# Patient Record
Sex: FEMALE | Race: BLACK OR AFRICAN AMERICAN | NOT HISPANIC OR LATINO
[De-identification: names, ages, dates, MRNs, and addresses within clinical notes are randomized per-mention and may not be internally consistent; named-entity substitution may affect disease eponyms.]

---

## 2017-03-28 ENCOUNTER — RESULT REVIEW (OUTPATIENT)
Age: 62
End: 2017-03-28

## 2017-03-29 ENCOUNTER — OUTPATIENT (OUTPATIENT)
Dept: OUTPATIENT SERVICES | Facility: HOSPITAL | Age: 62
LOS: 1 days | End: 2017-03-29

## 2017-03-29 ENCOUNTER — APPOINTMENT (OUTPATIENT)
Dept: OBGYN | Facility: HOSPITAL | Age: 62
End: 2017-03-29

## 2017-03-29 ENCOUNTER — APPOINTMENT (OUTPATIENT)
Dept: INTERNAL MEDICINE | Facility: HOSPITAL | Age: 62
End: 2017-03-29

## 2017-03-29 VITALS
SYSTOLIC BLOOD PRESSURE: 150 MMHG | DIASTOLIC BLOOD PRESSURE: 82 MMHG | BODY MASS INDEX: 37.91 KG/M2 | WEIGHT: 214 LBS | HEART RATE: 61 BPM

## 2017-03-29 VITALS — DIASTOLIC BLOOD PRESSURE: 76 MMHG | SYSTOLIC BLOOD PRESSURE: 142 MMHG

## 2017-03-29 DIAGNOSIS — L85.3 XEROSIS CUTIS: ICD-10-CM

## 2017-03-29 DIAGNOSIS — L29.9 PRURITUS, UNSPECIFIED: ICD-10-CM

## 2017-03-29 DIAGNOSIS — Z98.89 OTHER SPECIFIED POSTPROCEDURAL STATES: Chronic | ICD-10-CM

## 2017-03-29 DIAGNOSIS — L95.8 OTHER VASCULITIS LIMITED TO THE SKIN: ICD-10-CM

## 2017-03-29 DIAGNOSIS — Z12.11 ENCOUNTER FOR SCREENING FOR MALIGNANT NEOPLASM OF COLON: ICD-10-CM

## 2017-03-30 DIAGNOSIS — I10 ESSENTIAL (PRIMARY) HYPERTENSION: ICD-10-CM

## 2017-03-30 DIAGNOSIS — Z01.419 ENCOUNTER FOR GYNECOLOGICAL EXAMINATION (GENERAL) (ROUTINE) WITHOUT ABNORMAL FINDINGS: ICD-10-CM

## 2017-03-30 DIAGNOSIS — R73.09 OTHER ABNORMAL GLUCOSE: ICD-10-CM

## 2017-03-30 DIAGNOSIS — E66.9 OBESITY, UNSPECIFIED: ICD-10-CM

## 2017-03-31 DIAGNOSIS — R73.09 OTHER ABNORMAL GLUCOSE: ICD-10-CM

## 2017-03-31 DIAGNOSIS — I10 ESSENTIAL (PRIMARY) HYPERTENSION: ICD-10-CM

## 2017-03-31 DIAGNOSIS — E66.9 OBESITY, UNSPECIFIED: ICD-10-CM

## 2017-07-11 ENCOUNTER — APPOINTMENT (OUTPATIENT)
Dept: INTERNAL MEDICINE | Facility: HOSPITAL | Age: 62
End: 2017-07-11

## 2017-07-11 ENCOUNTER — OUTPATIENT (OUTPATIENT)
Dept: OUTPATIENT SERVICES | Facility: HOSPITAL | Age: 62
LOS: 1 days | End: 2017-07-11
Payer: COMMERCIAL

## 2017-07-11 ENCOUNTER — LABORATORY RESULT (OUTPATIENT)
Age: 62
End: 2017-07-11

## 2017-07-11 VITALS — SYSTOLIC BLOOD PRESSURE: 153 MMHG | HEART RATE: 84 BPM | DIASTOLIC BLOOD PRESSURE: 84 MMHG

## 2017-07-11 VITALS — WEIGHT: 211 LBS | HEIGHT: 63 IN | BODY MASS INDEX: 37.39 KG/M2

## 2017-07-11 DIAGNOSIS — Z98.89 OTHER SPECIFIED POSTPROCEDURAL STATES: Chronic | ICD-10-CM

## 2017-07-11 LAB
CHOLEST SERPL-MCNC: 218 MG/DL — HIGH (ref 120–199)
HDLC SERPL-MCNC: 87 MG/DL — HIGH (ref 45–65)
LIPID PNL WITH DIRECT LDL SERPL: 127 MG/DL — SIGNIFICANT CHANGE UP
TRIGL SERPL-MCNC: 80 MG/DL — SIGNIFICANT CHANGE UP (ref 10–149)

## 2017-07-12 DIAGNOSIS — R73.09 OTHER ABNORMAL GLUCOSE: ICD-10-CM

## 2017-07-12 DIAGNOSIS — K21.9 GASTRO-ESOPHAGEAL REFLUX DISEASE WITHOUT ESOPHAGITIS: ICD-10-CM

## 2017-07-12 DIAGNOSIS — M25.569 PAIN IN UNSPECIFIED KNEE: ICD-10-CM

## 2017-07-12 DIAGNOSIS — E66.9 OBESITY, UNSPECIFIED: ICD-10-CM

## 2017-07-12 DIAGNOSIS — I10 ESSENTIAL (PRIMARY) HYPERTENSION: ICD-10-CM

## 2017-07-16 ENCOUNTER — FORM ENCOUNTER (OUTPATIENT)
Age: 62
End: 2017-07-16

## 2017-07-17 ENCOUNTER — APPOINTMENT (OUTPATIENT)
Dept: RADIOLOGY | Facility: HOSPITAL | Age: 62
End: 2017-07-17

## 2017-07-17 PROCEDURE — 73562 X-RAY EXAM OF KNEE 3: CPT | Mod: 26,RT

## 2017-07-26 ENCOUNTER — APPOINTMENT (OUTPATIENT)
Dept: ORTHOPEDIC SURGERY | Facility: HOSPITAL | Age: 62
End: 2017-07-26

## 2017-08-09 ENCOUNTER — OUTPATIENT (OUTPATIENT)
Dept: OUTPATIENT SERVICES | Facility: HOSPITAL | Age: 62
LOS: 1 days | End: 2017-08-09

## 2017-08-09 ENCOUNTER — APPOINTMENT (OUTPATIENT)
Dept: INTERNAL MEDICINE | Facility: HOSPITAL | Age: 62
End: 2017-08-09

## 2017-08-09 ENCOUNTER — EMERGENCY (EMERGENCY)
Facility: HOSPITAL | Age: 62
LOS: 1 days | Discharge: HOME | End: 2017-08-09
Attending: EMERGENCY MEDICINE | Admitting: EMERGENCY MEDICINE
Payer: MEDICAID

## 2017-08-09 VITALS
SYSTOLIC BLOOD PRESSURE: 140 MMHG | RESPIRATION RATE: 16 BRPM | TEMPERATURE: 98 F | DIASTOLIC BLOOD PRESSURE: 73 MMHG | HEART RATE: 55 BPM | OXYGEN SATURATION: 100 %

## 2017-08-09 VITALS
TEMPERATURE: 97 F | DIASTOLIC BLOOD PRESSURE: 75 MMHG | OXYGEN SATURATION: 98 % | RESPIRATION RATE: 18 BRPM | SYSTOLIC BLOOD PRESSURE: 142 MMHG | HEART RATE: 59 BPM

## 2017-08-09 VITALS — WEIGHT: 212 LBS | HEIGHT: 63 IN | BODY MASS INDEX: 37.56 KG/M2

## 2017-08-09 DIAGNOSIS — Z98.89 OTHER SPECIFIED POSTPROCEDURAL STATES: Chronic | ICD-10-CM

## 2017-08-09 PROCEDURE — 93970 EXTREMITY STUDY: CPT | Mod: 26

## 2017-08-09 PROCEDURE — 99284 EMERGENCY DEPT VISIT MOD MDM: CPT

## 2017-08-10 DIAGNOSIS — M79.89 OTHER SPECIFIED SOFT TISSUE DISORDERS: ICD-10-CM

## 2018-01-03 ENCOUNTER — OUTPATIENT (OUTPATIENT)
Dept: OUTPATIENT SERVICES | Facility: HOSPITAL | Age: 63
LOS: 1 days | End: 2018-01-03

## 2018-01-03 ENCOUNTER — LABORATORY RESULT (OUTPATIENT)
Age: 63
End: 2018-01-03

## 2018-01-03 ENCOUNTER — APPOINTMENT (OUTPATIENT)
Dept: INTERNAL MEDICINE | Facility: HOSPITAL | Age: 63
End: 2018-01-03

## 2018-01-03 VITALS — WEIGHT: 212.07 LBS | HEIGHT: 63 IN | BODY MASS INDEX: 37.57 KG/M2

## 2018-01-03 VITALS — DIASTOLIC BLOOD PRESSURE: 68 MMHG | SYSTOLIC BLOOD PRESSURE: 134 MMHG

## 2018-01-03 DIAGNOSIS — Z98.89 OTHER SPECIFIED POSTPROCEDURAL STATES: Chronic | ICD-10-CM

## 2018-01-03 LAB
BUN SERPL-MCNC: 10 MG/DL — SIGNIFICANT CHANGE UP (ref 7–23)
CALCIUM SERPL-MCNC: 9.4 MG/DL — SIGNIFICANT CHANGE UP (ref 8.4–10.5)
CHLORIDE SERPL-SCNC: 102 MMOL/L — SIGNIFICANT CHANGE UP (ref 98–107)
CHOLEST SERPL-MCNC: 226 MG/DL — HIGH (ref 120–199)
CO2 SERPL-SCNC: 28 MMOL/L — SIGNIFICANT CHANGE UP (ref 22–31)
CREAT SERPL-MCNC: 0.75 MG/DL — SIGNIFICANT CHANGE UP (ref 0.5–1.3)
GLUCOSE SERPL-MCNC: 93 MG/DL — SIGNIFICANT CHANGE UP (ref 70–99)
HBA1C BLD-MCNC: 6 % — HIGH (ref 4–5.6)
HDLC SERPL-MCNC: 76 MG/DL — HIGH (ref 45–65)
LIPID PNL WITH DIRECT LDL SERPL: 140 MG/DL — SIGNIFICANT CHANGE UP
POTASSIUM SERPL-MCNC: 3.8 MMOL/L — SIGNIFICANT CHANGE UP (ref 3.5–5.3)
POTASSIUM SERPL-SCNC: 3.8 MMOL/L — SIGNIFICANT CHANGE UP (ref 3.5–5.3)
SODIUM SERPL-SCNC: 142 MMOL/L — SIGNIFICANT CHANGE UP (ref 135–145)
TRIGL SERPL-MCNC: 113 MG/DL — SIGNIFICANT CHANGE UP (ref 10–149)

## 2018-01-08 DIAGNOSIS — E66.9 OBESITY, UNSPECIFIED: ICD-10-CM

## 2018-01-08 DIAGNOSIS — J30.9 ALLERGIC RHINITIS, UNSPECIFIED: ICD-10-CM

## 2018-01-08 DIAGNOSIS — E78.5 HYPERLIPIDEMIA, UNSPECIFIED: ICD-10-CM

## 2018-01-08 DIAGNOSIS — I10 ESSENTIAL (PRIMARY) HYPERTENSION: ICD-10-CM

## 2018-01-08 DIAGNOSIS — R01.1 CARDIAC MURMUR, UNSPECIFIED: ICD-10-CM

## 2018-01-08 DIAGNOSIS — M25.569 PAIN IN UNSPECIFIED KNEE: ICD-10-CM

## 2018-01-08 DIAGNOSIS — R73.09 OTHER ABNORMAL GLUCOSE: ICD-10-CM

## 2018-01-17 ENCOUNTER — APPOINTMENT (OUTPATIENT)
Dept: ORTHOPEDIC SURGERY | Facility: HOSPITAL | Age: 63
End: 2018-01-17

## 2018-01-25 ENCOUNTER — APPOINTMENT (OUTPATIENT)
Dept: CV DIAGNOSITCS | Facility: HOSPITAL | Age: 63
End: 2018-01-25
Payer: COMMERCIAL

## 2018-01-25 ENCOUNTER — OUTPATIENT (OUTPATIENT)
Dept: OUTPATIENT SERVICES | Facility: HOSPITAL | Age: 63
LOS: 1 days | End: 2018-01-25

## 2018-01-25 DIAGNOSIS — R01.1 CARDIAC MURMUR, UNSPECIFIED: ICD-10-CM

## 2018-01-25 DIAGNOSIS — Z98.89 OTHER SPECIFIED POSTPROCEDURAL STATES: Chronic | ICD-10-CM

## 2018-01-25 PROCEDURE — 93306 TTE W/DOPPLER COMPLETE: CPT | Mod: 26

## 2018-05-31 ENCOUNTER — FORM ENCOUNTER (OUTPATIENT)
Age: 63
End: 2018-05-31

## 2018-06-01 ENCOUNTER — OUTPATIENT (OUTPATIENT)
Dept: OUTPATIENT SERVICES | Facility: HOSPITAL | Age: 63
LOS: 1 days | End: 2018-06-01
Payer: SELF-PAY

## 2018-06-01 ENCOUNTER — APPOINTMENT (OUTPATIENT)
Dept: MAMMOGRAPHY | Facility: IMAGING CENTER | Age: 63
End: 2018-06-01
Payer: COMMERCIAL

## 2018-06-01 DIAGNOSIS — Z98.89 OTHER SPECIFIED POSTPROCEDURAL STATES: Chronic | ICD-10-CM

## 2018-06-01 DIAGNOSIS — Z00.00 ENCOUNTER FOR GENERAL ADULT MEDICAL EXAMINATION WITHOUT ABNORMAL FINDINGS: ICD-10-CM

## 2018-06-01 PROCEDURE — 77067 SCR MAMMO BI INCL CAD: CPT

## 2018-06-01 PROCEDURE — 77067 SCR MAMMO BI INCL CAD: CPT | Mod: 26

## 2018-06-01 PROCEDURE — 77063 BREAST TOMOSYNTHESIS BI: CPT | Mod: 26

## 2018-06-01 PROCEDURE — 77063 BREAST TOMOSYNTHESIS BI: CPT

## 2018-06-22 ENCOUNTER — OUTPATIENT (OUTPATIENT)
Dept: OUTPATIENT SERVICES | Facility: HOSPITAL | Age: 63
LOS: 1 days | End: 2018-06-22
Payer: COMMERCIAL

## 2018-06-22 ENCOUNTER — APPOINTMENT (OUTPATIENT)
Dept: INTERNAL MEDICINE | Facility: HOSPITAL | Age: 63
End: 2018-06-22

## 2018-06-22 VITALS — SYSTOLIC BLOOD PRESSURE: 122 MMHG | DIASTOLIC BLOOD PRESSURE: 71 MMHG | HEART RATE: 67 BPM

## 2018-06-22 VITALS — HEIGHT: 63 IN | BODY MASS INDEX: 38.62 KG/M2 | WEIGHT: 218 LBS

## 2018-06-22 DIAGNOSIS — R73.09 OTHER ABNORMAL GLUCOSE: ICD-10-CM

## 2018-06-22 DIAGNOSIS — K21.9 GASTRO-ESOPHAGEAL REFLUX DISEASE WITHOUT ESOPHAGITIS: ICD-10-CM

## 2018-06-22 DIAGNOSIS — Z98.89 OTHER SPECIFIED POSTPROCEDURAL STATES: Chronic | ICD-10-CM

## 2018-06-22 DIAGNOSIS — Z00.00 ENCOUNTER FOR GENERAL ADULT MEDICAL EXAMINATION WITHOUT ABNORMAL FINDINGS: ICD-10-CM

## 2018-06-22 DIAGNOSIS — R01.1 CARDIAC MURMUR, UNSPECIFIED: ICD-10-CM

## 2018-06-22 DIAGNOSIS — J30.9 ALLERGIC RHINITIS, UNSPECIFIED: ICD-10-CM

## 2018-06-22 DIAGNOSIS — L50.8 OTHER URTICARIA: ICD-10-CM

## 2018-06-22 DIAGNOSIS — E78.5 HYPERLIPIDEMIA, UNSPECIFIED: ICD-10-CM

## 2018-06-22 DIAGNOSIS — E66.9 OBESITY, UNSPECIFIED: ICD-10-CM

## 2018-06-22 DIAGNOSIS — I10 ESSENTIAL (PRIMARY) HYPERTENSION: ICD-10-CM

## 2018-06-22 DIAGNOSIS — G56.00 CARPAL TUNNEL SYNDROME, UNSPECIFIED UPPER LIMB: ICD-10-CM

## 2019-01-14 ENCOUNTER — LABORATORY RESULT (OUTPATIENT)
Age: 64
End: 2019-01-14

## 2019-01-14 ENCOUNTER — OUTPATIENT (OUTPATIENT)
Dept: OUTPATIENT SERVICES | Facility: HOSPITAL | Age: 64
LOS: 1 days | End: 2019-01-14

## 2019-01-14 ENCOUNTER — APPOINTMENT (OUTPATIENT)
Dept: INTERNAL MEDICINE | Facility: HOSPITAL | Age: 64
End: 2019-01-14

## 2019-01-14 VITALS — SYSTOLIC BLOOD PRESSURE: 153 MMHG | HEART RATE: 73 BPM | RESPIRATION RATE: 16 BRPM | DIASTOLIC BLOOD PRESSURE: 91 MMHG

## 2019-01-14 VITALS — WEIGHT: 216.56 LBS | HEIGHT: 63 IN | BODY MASS INDEX: 38.37 KG/M2

## 2019-01-14 DIAGNOSIS — Z98.89 OTHER SPECIFIED POSTPROCEDURAL STATES: Chronic | ICD-10-CM

## 2019-01-14 LAB
ALBUMIN SERPL ELPH-MCNC: 4.7 G/DL — SIGNIFICANT CHANGE UP (ref 3.3–5)
ALP SERPL-CCNC: 98 U/L — SIGNIFICANT CHANGE UP (ref 40–120)
ALT FLD-CCNC: 15 U/L — SIGNIFICANT CHANGE UP (ref 4–33)
ANION GAP SERPL CALC-SCNC: 13 MEQ/L — SIGNIFICANT CHANGE UP (ref 7–14)
AST SERPL-CCNC: 21 U/L — SIGNIFICANT CHANGE UP (ref 4–32)
BILIRUB SERPL-MCNC: 0.2 MG/DL — SIGNIFICANT CHANGE UP (ref 0.2–1.2)
BUN SERPL-MCNC: 13 MG/DL — SIGNIFICANT CHANGE UP (ref 7–23)
CALCIUM SERPL-MCNC: 9.8 MG/DL — SIGNIFICANT CHANGE UP (ref 8.4–10.5)
CHLORIDE SERPL-SCNC: 101 MMOL/L — SIGNIFICANT CHANGE UP (ref 98–107)
CO2 SERPL-SCNC: 29 MMOL/L — SIGNIFICANT CHANGE UP (ref 22–31)
CREAT SERPL-MCNC: 0.69 MG/DL — SIGNIFICANT CHANGE UP (ref 0.5–1.3)
GLUCOSE SERPL-MCNC: 87 MG/DL — SIGNIFICANT CHANGE UP (ref 70–99)
HBA1C BLD-MCNC: 6 % — HIGH (ref 4–5.6)
MAGNESIUM SERPL-MCNC: 2 MG/DL — SIGNIFICANT CHANGE UP (ref 1.6–2.6)
PHOSPHATE SERPL-MCNC: 2.5 MG/DL — SIGNIFICANT CHANGE UP (ref 2.5–4.5)
POTASSIUM SERPL-MCNC: 3.4 MMOL/L — LOW (ref 3.5–5.3)
POTASSIUM SERPL-SCNC: 3.4 MMOL/L — LOW (ref 3.5–5.3)
PROT SERPL-MCNC: 8.4 G/DL — HIGH (ref 6–8.3)
SODIUM SERPL-SCNC: 143 MMOL/L — SIGNIFICANT CHANGE UP (ref 135–145)

## 2019-01-14 NOTE — PHYSICAL EXAM
[No Acute Distress] : no acute distress [Well Nourished] : well nourished [Well Developed] : well developed [Normal Sclera/Conjunctiva] : normal sclera/conjunctiva [PERRL] : pupils equal round and reactive to light [EOMI] : extraocular movements intact [Normal Outer Ear/Nose] : the outer ears and nose were normal in appearance [Normal Oropharynx] : the oropharynx was normal [No JVD] : no jugular venous distention [Supple] : supple [No Lymphadenopathy] : no lymphadenopathy [No Respiratory Distress] : no respiratory distress  [Clear to Auscultation] : lungs were clear to auscultation bilaterally [Normal Rate] : normal rate  [Regular Rhythm] : with a regular rhythm [Normal S1, S2] : normal S1 and S2 [II] : a grade 2 [Mid] : mid [Pedal Pulses Present] : the pedal pulses are present [No Extremity Clubbing/Cyanosis] : no extremity clubbing/cyanosis [Soft] : abdomen soft [Non Tender] : non-tender [Non-distended] : non-distended [No HSM] : no HSM [Normal Supraclavicular Nodes] : no supraclavicular lymphadenopathy [Normal Anterior Cervical Nodes] : no anterior cervical lymphadenopathy [No CVA Tenderness] : no CVA  tenderness [No Spinal Tenderness] : no spinal tenderness [Grossly Normal Strength/Tone] : grossly normal strength/tone [No Rash] : no rash [No Skin Lesions] : no skin lesions [Coordination Grossly Intact] : coordination grossly intact [No Focal Deficits] : no focal deficits [Normal Affect] : the affect was normal [Alert and Oriented x3] : oriented to person, place, and time [Normal Mood] : the mood was normal [Normal Insight/Judgement] : insight and judgment were intact [de-identified] : pain on passive and active ROM of right knee, no crepitus or popping noted

## 2019-01-14 NOTE — ASSESSMENT
[FreeTextEntry1] : 62 yo F with PMH of obesity, HTN, preDM, L medial meniscal tear s/p arthroscopic surgery, GERD p/w follow up of chronic medical problems including right knee pain and occasional nasal congestion. \par \par 1) Right knee pain - denies erythema and swelling; pt does experience swelling in the summer.  Pt experiences "crackles" pt wakes up with morning stiffness; pain is bad at all times and there is no period of mitigation or exacerbation per the pt.  Pt does not take Tylenol or NSAIDs for pain.  \par - Likely due to OA. Recommended conservative measures including weight loss and pain control. Patient has no insurance and cannot afford PT.  Pt has seen orthopedic surgery one time; had injection of synovial fluid mimetic, which improved her pain temporarily. \par - R and L knee x-rays 4 view\par - Tylenol recommended for pain\par - ortho referral for TKR eval\par - [facility] c/s for assistance w/ insurance for PT\par - referred for complex care to assist w/ coordination \par \par 2) Nasal congestion- Likely due to allergic rhinitis, prescribed Flonase; pt not using flonase, and is not significant on this examination.  \par \par 3) Elevated A1c- Patient counseled about lifestyle modifications, A1c of 6; regarding her diet, she just "eats breads and all those snacks."  Pt does not have a machine to check sugar at home and requested one.  \par - A1c today\par - will start Metformin 500 BID to assist w/ control and weight loss\par - nutrition c/s\par - dietary counseling given\par \par 4) HTN- BP mildly elevated today \par - increase to Amlodipine 10\par - c/w HCTZ\par - encouraged pt to maintain low salt diet and take BP at home once a week; already has home BP machine\par \par 5) Systolic heart murmur- not appreciated on my exam, TTE WNL, minimal mitral and tricuspid regurgitation\par \par 6) Obesity- Counseled on lifestyle modifications; pt is non-compliant with diet\par - nutrition c/s\par \par 7) HCM- Patient defers flu vaccine, cannot Shingrix\par - Patient has referral for mammogram, UTD w/ PAP (1/2018) and colonoscopy (2016) both of which were normal\par - Hep C screen negative, declines HIV screen\par - will check A1c/CBC/CMP/Mg/Phos/TSH today\par \par RTC in 5 weeks\par \par Deonte Becker, F5\par D/w Dr. Torrez\par

## 2019-01-14 NOTE — END OF VISIT
[] : Resident [FreeTextEntry3] : 63F with morbid obesity, pre-DM, HTN, GERd here for follow-up. BP elevated today will increase norvasc to 10mg. Starting metformin for pre-DM and obesity. Orthopedics referral given for knee pain, patient likely requires assessment for TKR. Agree with remainder of plan as docuented by Dr. Becker.

## 2019-01-14 NOTE — DATA REVIEWED
[FreeTextEntry1] : CBC and CMP from 8/2017 WNL\par Lipid profile 7/2017: TGs 80, HDL 87, \par A1c 6.3% 12/2016\par Hep C screen negative\par \par PAP and HPV negative 3/2017\par Mammogram 2/2016 [facility] 1\par Colonoscopy WNL 11/2016\par \par Xray right knee showed mild-moderate right knee effusion and osteophyte, but preservation of the joint space

## 2019-01-14 NOTE — HISTORY OF PRESENT ILLNESS
[de-identified] : 61 yo F with PMH of obesity, HTN, preDM, medial meniscal tear s/p arthroscopic surgery, GERD p/w follow up of chronic medical problems including right knee pain. \par \par Pt is still complaining of  breast spasm.  Pain located along latissiumus dorsi muscle and back. Pt is unsure if it feels more like a "tired arm pit."  There's no TTP, discharge, no skin changes/dimpling/swelling in any part of the breast, and describes it as a "spasm."  She only has it at night, relaxes, and then has it twice a night.  \par \par Regarding chronic knee pain, it has not improved, but it's not worsening.  She has had this for years.  She is able to ambulate without assistance, but experiences considerable discomfort when bearing weight or moving the joint. Tyenol and ibuprofen provide minimal relief. She had a cortisone injection about 1 year ago which did not relieve her pain. Occasionally experiences clicking or popping of the joint. Denies trauma to the knee. No fevers or chills.  \par \par Regarding chronic nasal congestion, it still persists but is less.  She has a history of allergic rhinitis, but has not used any oral medications but has tried a nasal spray whose name she does not remember and if she uses it too frequently, she has a nosebleed.  Discharge is clear; cough has resolved; still has lleft-sided head and sinus pressure occasionally. Denies fever, chills, CP, SOB. \par \par Concerning obesity and elevated A1c, she states that she tries to avoid caloric foods, but eats more bread and sweets than she would like. Her activity is limited by her knee pain. She had seen a nutritionist in the past and is aware of what changes she can make in her diet, but has not implemented these recommendations as of yet. \par \par ROS otherwise negative [FreeTextEntry1] : routine follow up

## 2019-01-14 NOTE — REVIEW OF SYSTEMS
[Fatigue] : fatigue [Nasal Discharge] : nasal discharge [Cough] : cough [Joint Pain] : joint pain [Joint Stiffness] : joint stiffness [Negative] : Heme/Lymph [Discharge] : no discharge [Vision Problems] : no vision problems [Hearing Loss] : no hearing loss [Sore Throat] : no sore throat [Chest Pain] : no chest pain [Palpitations] : no palpitations [Orthopnea] : no orthopnea [Shortness Of Breath] : no shortness of breath [Wheezing] : no wheezing [Dyspnea on Exertion] : no dyspnea on exertion [Abdominal Pain] : no abdominal pain [Nausea] : no nausea [Vomiting] : no vomiting [Melena] : no melena [Dysuria] : no dysuria [Hematuria] : no hematuria [Muscle Pain] : no muscle pain [Back Pain] : no back pain [Itching] : no itching [Skin Rash] : no skin rash [Headache] : no headache [Dizziness] : no dizziness [Anxiety] : no anxiety [Depression] : no depression [Easy Bleeding] : no easy bleeding [Easy Bruising] : no easy bruising [FreeTextEntry9] : knee, possible MSK pain in the breast region

## 2019-01-15 DIAGNOSIS — Z00.00 ENCOUNTER FOR GENERAL ADULT MEDICAL EXAMINATION WITHOUT ABNORMAL FINDINGS: ICD-10-CM

## 2019-01-15 DIAGNOSIS — I10 ESSENTIAL (PRIMARY) HYPERTENSION: ICD-10-CM

## 2019-01-15 DIAGNOSIS — R01.1 CARDIAC MURMUR, UNSPECIFIED: ICD-10-CM

## 2019-01-15 DIAGNOSIS — R73.09 OTHER ABNORMAL GLUCOSE: ICD-10-CM

## 2019-01-15 DIAGNOSIS — E78.5 HYPERLIPIDEMIA, UNSPECIFIED: ICD-10-CM

## 2019-01-15 DIAGNOSIS — E66.9 OBESITY, UNSPECIFIED: ICD-10-CM

## 2019-01-15 DIAGNOSIS — K21.9 GASTRO-ESOPHAGEAL REFLUX DISEASE WITHOUT ESOPHAGITIS: ICD-10-CM

## 2019-01-15 DIAGNOSIS — M25.569 PAIN IN UNSPECIFIED KNEE: ICD-10-CM

## 2019-01-18 NOTE — REVIEW OF SYSTEMS
[Nasal Discharge] : nasal discharge [Cough] : cough [Joint Pain] : joint pain [Joint Stiffness] : joint stiffness [Fatigue] : fatigue [Negative] : Heme/Lymph [Discharge] : no discharge [Vision Problems] : no vision problems [Hearing Loss] : no hearing loss [Sore Throat] : no sore throat [Chest Pain] : no chest pain [Palpitations] : no palpitations [Orthopnea] : no orthopnea [Shortness Of Breath] : no shortness of breath [Wheezing] : no wheezing [Dyspnea on Exertion] : no dyspnea on exertion [Abdominal Pain] : no abdominal pain [Nausea] : no nausea [Vomiting] : no vomiting [Melena] : no melena [Dysuria] : no dysuria [Hematuria] : no hematuria [Muscle Pain] : no muscle pain [Back Pain] : no back pain [Itching] : no itching [Skin Rash] : no skin rash [Headache] : no headache [Dizziness] : no dizziness [Anxiety] : no anxiety [Depression] : no depression [Easy Bleeding] : no easy bleeding [Easy Bruising] : no easy bruising [FreeTextEntry9] : knee, possible MSK pain in the breast region

## 2019-01-18 NOTE — END OF VISIT
[] : Resident [FreeTextEntry3] : 63F with morbid obesity, preDM, HTN, GERD here for follow-up, BP not controlled today, will increase norvasc to 10mg. Given obestiy and preDM will start metformin. Agree with remainder of plan as documented above by Dr. Becker.

## 2019-01-18 NOTE — ASSESSMENT
[FreeTextEntry1] : 61 yo F with PMH of obesity, HTN, preDM, medial meniscal tear s/p arthroscopic surgery, GERD p/w follow up of chronic medical problems including right knee pain and nasal congestion. \par \par 1) Right knee pain- Likely due to OA. Recommended conservative measures including weight loss and pain control. Pt has no insurance for PT\par - has not followed up with orthopedic surgery - will refer\par - R knee x-ray\par - [facility] c/s for assistance w/ insurance\par \par 2) Nasal congestion- Likely due to allergic rhinitis, prescribed Flonase; pt not using\par \par 3) Elevated A1c- Patient counseled about lifestyle modifications, A1c of 6\par - recheck A1c today to see how pt has improved\par - diabetes wellness\par - nutrition c/s\par - nutrition counseling given during visit; pt aware to avoid excessive amounts of bread, rice, cake, and other sweets\par \par 4) HTN- BP inadequately controlled\par - increase amlodipine to 10 mg\par - c/w HCTZ 25\par - f/u in 5 weeks for BP check\par - pt educated to check BPs at home every few days \par \par 5) Obesity- Counseled on lifestyle modifications; pt is non-compliant with diet\par - nutrition c/s\par \par 7) HCM- Patient defers flu vaccine, cannot afford Shingrix\par - Patient has referral for mammogram, UTD w/ PAP (1/2018) and colonoscopy (2016) both of which were normal\par - Hep C screen negative, declines HIV screen\par - check CMP, CBC, A1c, TSH, Mg, Phos, Alb/Cr\par - will refer for mammogram today\par \par RTC in 6 months for routine HCM\par \par Deonte Becker, F5\par d/w

## 2019-01-18 NOTE — PHYSICAL EXAM
[No Acute Distress] : no acute distress [Well Nourished] : well nourished [Well Developed] : well developed [Normal Sclera/Conjunctiva] : normal sclera/conjunctiva [PERRL] : pupils equal round and reactive to light [EOMI] : extraocular movements intact [Normal Outer Ear/Nose] : the outer ears and nose were normal in appearance [Normal Oropharynx] : the oropharynx was normal [No JVD] : no jugular venous distention [Supple] : supple [No Lymphadenopathy] : no lymphadenopathy [No Respiratory Distress] : no respiratory distress  [Clear to Auscultation] : lungs were clear to auscultation bilaterally [Normal Rate] : normal rate  [Regular Rhythm] : with a regular rhythm [Normal S1, S2] : normal S1 and S2 [II] : a grade 2 [Mid] : mid [Pedal Pulses Present] : the pedal pulses are present [No Extremity Clubbing/Cyanosis] : no extremity clubbing/cyanosis [Soft] : abdomen soft [Non Tender] : non-tender [Non-distended] : non-distended [No HSM] : no HSM [Normal Supraclavicular Nodes] : no supraclavicular lymphadenopathy [Normal Anterior Cervical Nodes] : no anterior cervical lymphadenopathy [No CVA Tenderness] : no CVA  tenderness [No Spinal Tenderness] : no spinal tenderness [Grossly Normal Strength/Tone] : grossly normal strength/tone [No Rash] : no rash [No Skin Lesions] : no skin lesions [Coordination Grossly Intact] : coordination grossly intact [No Focal Deficits] : no focal deficits [Normal Affect] : the affect was normal [Alert and Oriented x3] : oriented to person, place, and time [Normal Mood] : the mood was normal [Normal Insight/Judgement] : insight and judgment were intact [de-identified] : pain on passive and active ROM of right knee, no crepitus or popping noted

## 2019-01-18 NOTE — HISTORY OF PRESENT ILLNESS
[FreeTextEntry1] : routine follow up [de-identified] : 63 yo F with PMH of obesity, HTN, preDM, medial meniscal tear s/p arthroscopic surgery, GERD p/w follow up of chronic medical problems including right knee pain. \par \par The new issue for her is a breast spasm.  Pt is unsure if it feels more like a "tired arm pit."  There's no TTP, discharge, no skin changes/dimpling/swelling in any part of the breast, and describes it as a "spasm."  She only has it at night, relaxes, and then has it twice a night.  \par \par Regarding chronic knee pain, it has not improved, but it's not worsening.  She has had this for years.  She is able to ambulate without assistance, but experiences considerable discomfort when bearing weight or moving the joint. Tyenol and ibuprofen provide minimal relief. She had a cortisone injection about 1 year ago which did not relieve her pain. Occasionally experiences clicking or popping of the joint. Denies trauma to the knee. No fevers or chills.  \par \par Regarding chronic nasal congestion, it still persists.  She has a history of allergic rhinitis, but has not used any oral medications but has tried a nasal spray whose name she does not remember and if she uses it too frequently, she has a nosebleed.  Discharge is clear; cough has resolved; still has lleft-sided head and sinus pressure occasionally. Denies fever, chills, CP, SOB. \par \par Concerning obesity and elevated A1c, she states that she tries to avoid caloric foods, but eats more bread and sweets than she would like. Her activity is limited by her knee pain. She had seen a nutritionist in the past and is aware of what changes she can make in her diet, but has not implemented these recommendations as of yet. \par \par Pt is complaining of acid reflux for which she takes Nexium.  Pt does not take Atorvastatin; takes only Amlodipine and HCTZ.

## 2019-01-22 ENCOUNTER — FORM ENCOUNTER (OUTPATIENT)
Age: 64
End: 2019-01-22

## 2019-01-23 ENCOUNTER — APPOINTMENT (OUTPATIENT)
Dept: RADIOLOGY | Facility: HOSPITAL | Age: 64
End: 2019-01-23

## 2019-01-23 ENCOUNTER — OTHER (OUTPATIENT)
Age: 64
End: 2019-01-23

## 2019-01-23 PROCEDURE — 73564 X-RAY EXAM KNEE 4 OR MORE: CPT | Mod: 26,50

## 2019-02-07 ENCOUNTER — APPOINTMENT (OUTPATIENT)
Dept: INTERNAL MEDICINE | Facility: HOSPITAL | Age: 64
End: 2019-02-07

## 2019-02-12 ENCOUNTER — APPOINTMENT (OUTPATIENT)
Dept: INTERNAL MEDICINE | Facility: HOSPITAL | Age: 64
End: 2019-02-12

## 2019-03-27 NOTE — ED ADULT TRIAGE NOTE - TEMPERATURE IN FAHRENHEIT (DEGREES F)
Pt arrives per bell from a group home with failure to thrive and refusing to talk. Per EMs pt placed there x 3 weeks ago from home and sx have increased. Pt shakes head for yes or no questions. Pt refusing to talk. Pt denies any pain  
97.1

## 2019-03-29 ENCOUNTER — APPOINTMENT (OUTPATIENT)
Dept: INTERNAL MEDICINE | Facility: HOSPITAL | Age: 64
End: 2019-03-29

## 2019-03-29 ENCOUNTER — LABORATORY RESULT (OUTPATIENT)
Age: 64
End: 2019-03-29

## 2019-03-29 ENCOUNTER — OUTPATIENT (OUTPATIENT)
Dept: OUTPATIENT SERVICES | Facility: HOSPITAL | Age: 64
LOS: 1 days | End: 2019-03-29

## 2019-03-29 VITALS — HEART RATE: 77 BPM | SYSTOLIC BLOOD PRESSURE: 142 MMHG | DIASTOLIC BLOOD PRESSURE: 72 MMHG | RESPIRATION RATE: 16 BRPM

## 2019-03-29 VITALS — HEIGHT: 63 IN | BODY MASS INDEX: 37.82 KG/M2 | WEIGHT: 213.44 LBS

## 2019-03-29 DIAGNOSIS — Z98.89 OTHER SPECIFIED POSTPROCEDURAL STATES: Chronic | ICD-10-CM

## 2019-03-29 LAB
APPEARANCE UR: CLEAR — SIGNIFICANT CHANGE UP
BILIRUB UR-MCNC: NEGATIVE — SIGNIFICANT CHANGE UP
BLOOD UR QL VISUAL: NEGATIVE — SIGNIFICANT CHANGE UP
COLOR SPEC: SIGNIFICANT CHANGE UP
GLUCOSE UR-MCNC: NEGATIVE — SIGNIFICANT CHANGE UP
KETONES UR-MCNC: NEGATIVE — SIGNIFICANT CHANGE UP
LEUKOCYTE ESTERASE UR-ACNC: NEGATIVE — SIGNIFICANT CHANGE UP
NITRITE UR-MCNC: NEGATIVE — SIGNIFICANT CHANGE UP
PH UR: 7 — SIGNIFICANT CHANGE UP (ref 5–8)
PROT UR-MCNC: NEGATIVE — SIGNIFICANT CHANGE UP
SP GR SPEC: 1.02 — SIGNIFICANT CHANGE UP (ref 1–1.04)
UROBILINOGEN FLD QL: NORMAL — SIGNIFICANT CHANGE UP

## 2019-03-29 RX ORDER — ATORVASTATIN CALCIUM 10 MG/1
10 TABLET, FILM COATED ORAL
Qty: 90 | Refills: 1 | Status: DISCONTINUED | COMMUNITY
Start: 2018-01-05 | End: 2019-03-29

## 2019-03-29 RX ORDER — HYDROCHLOROTHIAZIDE 25 MG/1
25 TABLET ORAL DAILY
Qty: 30 | Refills: 5 | Status: DISCONTINUED | COMMUNITY
Start: 2019-01-14 | End: 2019-03-29

## 2019-03-29 RX ORDER — ATORVASTATIN CALCIUM 20 MG/1
20 TABLET, FILM COATED ORAL
Qty: 30 | Refills: 5 | Status: DISCONTINUED | COMMUNITY
Start: 2019-01-14 | End: 2019-03-29

## 2019-03-29 NOTE — HISTORY OF PRESENT ILLNESS
[FreeTextEntry1] : routine follow up [de-identified] : 62 yo F with PMH of obesity, HTN, preDM, medial meniscal tear s/p arthroscopic surgery, GERD p/w follow up of chronic medical problems including right knee pain. \par \par On Saturday, pt fell, R knee popped.  Pt did not hit her head or lost consciousness; pt hit her "sore breast."  \par \par Pt states that when she urinates, it is "shooting up onto the toilet seat" and it "trickles."  Pt denies dysuria, hematuria, denies strange smell.  Pt is requesting referral to gynecologist.  \par \par Pt is still complaining of  breast spasm.  Pain located along latissiumus dorsi muscle and back. Pt is unsure if it feels more like a "tired arm pit."  There's no TTP, discharge, no skin changes/dimpling/swelling in any part of the breast, and describes it as a "spasm."  She only has it at night, relaxes, and then has it twice a night.  \par \par Regarding chronic R knee pain, it was very bad; went to an orthopedic surgeon and got a cortisone shot about a month ago.  She has had this for years.  She is able to ambulate without assistance, but experiences considerable discomfort when bearing weight or moving the joint. Tyenol and ibuprofen provide minimal relief. She had a cortisone injection about 1 year ago which did not relieve her pain. Occasionally experiences clicking or popping of the joint. Denies trauma to the knee. No fevers or chills.  \par \par Regarding chronic nasal congestion, and states that "she can't find her throat" and scrapes until she "spits blood."  Pt uses Flonase, but feels like there's "something in the back of the throat," but occasionally has nose bleed.  She has a history of allergic rhinitis, but has not used any oral medications but has tried a nasal spray but upon review, she has been using it incorrectly.  Discharge is clear; cough has resolved; still has left-sided head and sinus pressure occasionally. Denies fever, chills, CP, SOB. This has been happening for years.  \par \par Concerning obesity and elevated A1c, she states that she tries to avoid caloric foods, but eats more bread and sweets than she would lik but is trying to cut down - she does not eat a whole loaf of bread in a day anymore and is trying to eat less rice. Her activity is limited by her knee pain. She had seen a nutritionist in the past and is aware of what changes she must make.\par \par ROS otherwise negative

## 2019-03-29 NOTE — PHYSICAL EXAM
[No Acute Distress] : no acute distress [Well Nourished] : well nourished [Well Developed] : well developed [Normal Sclera/Conjunctiva] : normal sclera/conjunctiva [PERRL] : pupils equal round and reactive to light [EOMI] : extraocular movements intact [Normal Outer Ear/Nose] : the outer ears and nose were normal in appearance [Normal Oropharynx] : the oropharynx was normal [No JVD] : no jugular venous distention [Supple] : supple [No Lymphadenopathy] : no lymphadenopathy [No Respiratory Distress] : no respiratory distress  [Clear to Auscultation] : lungs were clear to auscultation bilaterally [Normal Rate] : normal rate  [Regular Rhythm] : with a regular rhythm [Normal S1, S2] : normal S1 and S2 [II] : a grade 2 [Mid] : mid [Pedal Pulses Present] : the pedal pulses are present [No Extremity Clubbing/Cyanosis] : no extremity clubbing/cyanosis [Soft] : abdomen soft [Non Tender] : non-tender [Non-distended] : non-distended [No HSM] : no HSM [Normal Supraclavicular Nodes] : no supraclavicular lymphadenopathy [Normal Anterior Cervical Nodes] : no anterior cervical lymphadenopathy [No CVA Tenderness] : no CVA  tenderness [No Spinal Tenderness] : no spinal tenderness [Grossly Normal Strength/Tone] : grossly normal strength/tone [No Rash] : no rash [No Skin Lesions] : no skin lesions [Coordination Grossly Intact] : coordination grossly intact [No Focal Deficits] : no focal deficits [Normal Affect] : the affect was normal [Alert and Oriented x3] : oriented to person, place, and time [Normal Mood] : the mood was normal [Normal Insight/Judgement] : insight and judgment were intact [de-identified] : 2+ b/l LE edema [de-identified] : pain on passive and active ROM of right knee, no crepitus or popping noted

## 2019-03-29 NOTE — REVIEW OF SYSTEMS
[Nasal Discharge] : nasal discharge [Cough] : cough [Joint Pain] : joint pain [Joint Stiffness] : joint stiffness [Negative] : Heme/Lymph [Fatigue] : no fatigue [Discharge] : no discharge [Vision Problems] : no vision problems [Hearing Loss] : no hearing loss [Sore Throat] : no sore throat [Chest Pain] : no chest pain [Palpitations] : no palpitations [Orthopnea] : no orthopnea [Shortness Of Breath] : no shortness of breath [Wheezing] : no wheezing [Dyspnea on Exertion] : no dyspnea on exertion [Abdominal Pain] : no abdominal pain [Nausea] : no nausea [Vomiting] : no vomiting [Melena] : no melena [Dysuria] : no dysuria [Hematuria] : no hematuria [Muscle Pain] : no muscle pain [Back Pain] : no back pain [Itching] : no itching [Skin Rash] : no skin rash [Headache] : no headache [Dizziness] : no dizziness [Anxiety] : no anxiety [Depression] : no depression [Easy Bleeding] : no easy bleeding [Easy Bruising] : no easy bruising [FreeTextEntry9] : knee, possible MSK pain in the breast region

## 2019-03-29 NOTE — ASSESSMENT
[FreeTextEntry1] : 64 yo F with PMH of obesity, HTN, preDM, L medial meniscal tear s/p arthroscopic surgery, GERD p/w follow up of chronic medical problems including right knee pain and occasional nasal congestion. \par \par 1) Right knee pain - denies erythema and swelling; pt does experience swelling in the summer.  Pt experiences "crackles" pt wakes up with morning stiffness; pain is bad at all times and there is no period of mitigation or exacerbation per the pt.  Pt does not take Tylenol or NSAIDs for pain.  \par - Likely due to OA. Recommended conservative measures including weight loss and pain control. Patient has no insurance and cannot afford PT.  Pt has seen orthopedic surgery one time; had injection of synovial fluid mimetic, which improved her pain temporarily. \par - Tylenol recommended for pain\par - referred for complex care to assist w/ coordination \par - c/w ortho follow up on her own as she sees someone outside of [facility]\par \par 2) Nasal congestion- Likely due to allergic rhinitis, prescribed Flonase; pt not using flonase, and is not significant on this examination.  \par - pt educated on how to properly use flonase w/ demonstration\par \par 3) Elevated A1c- Patient counseled about lifestyle modifications, A1c of 6; regarding her diet, she just "eats breads and all those snacks." \par - will start Metformin 500 BID to assist w/ control and weight loss; pt is saying that she cannot afford it due to lack of insurance - referred to triage nurse for assistance w/ finding an affordable pharmacy\par - nutrition c/s\par - dietary counseling given again\par \par 4) HTN- BP mildly elevated today - pt has issues affording her medication due to social determinants \par - increase to Amlodipine 10\par - c/w HCTZ\par - encouraged pt to maintain low salt diet and take BP at home once a week; already has home BP machine\par \par 5) Systolic heart murmur- not appreciated on my exam, TTE WNL, minimal mitral and tricuspid regurgitation\par \par 6) Obesity- Counseled on lifestyle modifications; pt is non-compliant with diet\par - nutrition c/s\par \par 7) Breast spasm - unclear etiology\par - will get imaging of L breast w/ ultrasound due to dense breast to r/o malignancy not visible on mammogram \par - will refer to breast clinic\par \par 8) Upwards urination - unclear etiology\par - GYN referral + urology referral\par - UA today\par \par 9) HCM- Patient defers flu vaccine\par - Patient has referral for mammogram, UTD w/ PAP (1/2018) and colonoscopy (2016) both of which were normal\par - Hep C screen negative, declines HIV screen\par \par RTC in 5 weeks\par \par Deonte Becker, F5\par D/w Dr. Sanchez\par

## 2019-04-01 DIAGNOSIS — R73.09 OTHER ABNORMAL GLUCOSE: ICD-10-CM

## 2019-04-04 ENCOUNTER — APPOINTMENT (OUTPATIENT)
Dept: MAMMOGRAPHY | Facility: IMAGING CENTER | Age: 64
End: 2019-04-04
Payer: COMMERCIAL

## 2019-04-04 ENCOUNTER — OUTPATIENT (OUTPATIENT)
Dept: OUTPATIENT SERVICES | Facility: HOSPITAL | Age: 64
LOS: 1 days | End: 2019-04-04
Payer: SELF-PAY

## 2019-04-04 ENCOUNTER — APPOINTMENT (OUTPATIENT)
Dept: ULTRASOUND IMAGING | Facility: IMAGING CENTER | Age: 64
End: 2019-04-04
Payer: COMMERCIAL

## 2019-04-04 ENCOUNTER — OUTPATIENT (OUTPATIENT)
Dept: OUTPATIENT SERVICES | Facility: HOSPITAL | Age: 64
LOS: 1 days | End: 2019-04-04

## 2019-04-04 ENCOUNTER — APPOINTMENT (OUTPATIENT)
Dept: OBGYN | Facility: HOSPITAL | Age: 64
End: 2019-04-04

## 2019-04-04 DIAGNOSIS — Z98.89 OTHER SPECIFIED POSTPROCEDURAL STATES: Chronic | ICD-10-CM

## 2019-04-04 DIAGNOSIS — Z00.00 ENCOUNTER FOR GENERAL ADULT MEDICAL EXAMINATION WITHOUT ABNORMAL FINDINGS: ICD-10-CM

## 2019-04-04 PROCEDURE — 76641 ULTRASOUND BREAST COMPLETE: CPT | Mod: 26,50

## 2019-04-04 PROCEDURE — 77065 DX MAMMO INCL CAD UNI: CPT

## 2019-04-04 PROCEDURE — 77065 DX MAMMO INCL CAD UNI: CPT | Mod: 26,LT

## 2019-04-04 PROCEDURE — G0279: CPT

## 2019-04-04 PROCEDURE — G0279: CPT | Mod: 26

## 2019-04-04 PROCEDURE — 76641 ULTRASOUND BREAST COMPLETE: CPT

## 2019-04-05 DIAGNOSIS — Z12.39 ENCOUNTER FOR OTHER SCREENING FOR MALIGNANT NEOPLASM OF BREAST: ICD-10-CM

## 2019-04-10 ENCOUNTER — APPOINTMENT (OUTPATIENT)
Dept: OBGYN | Facility: HOSPITAL | Age: 64
End: 2019-04-10
Payer: COMMERCIAL

## 2019-04-10 ENCOUNTER — OUTPATIENT (OUTPATIENT)
Dept: OUTPATIENT SERVICES | Facility: HOSPITAL | Age: 64
LOS: 1 days | End: 2019-04-10

## 2019-04-10 VITALS
HEART RATE: 77 BPM | HEIGHT: 64.8 IN | BODY MASS INDEX: 35.32 KG/M2 | SYSTOLIC BLOOD PRESSURE: 145 MMHG | WEIGHT: 212 LBS | DIASTOLIC BLOOD PRESSURE: 79 MMHG

## 2019-04-10 DIAGNOSIS — Z98.89 OTHER SPECIFIED POSTPROCEDURAL STATES: Chronic | ICD-10-CM

## 2019-04-10 PROCEDURE — 99396 PREV VISIT EST AGE 40-64: CPT | Mod: GC

## 2019-04-11 DIAGNOSIS — R39.11 HESITANCY OF MICTURITION: ICD-10-CM

## 2019-04-11 DIAGNOSIS — Z01.419 ENCOUNTER FOR GYNECOLOGICAL EXAMINATION (GENERAL) (ROUTINE) WITHOUT ABNORMAL FINDINGS: ICD-10-CM

## 2019-04-12 NOTE — HISTORY OF PRESENT ILLNESS
[1 Year Ago] : 1 year ago [Postmenopausal] : is postmenopausal [Fair] : being in fair health [Fever] : no fever [Nausea] : no nausea [Vomiting] : no vomiting [Diarrhea] : no diarrhea [Vaginal Bleeding] : no vaginal bleeding [Pelvic Pressure] : no pelvic pressure [Dysuria] : no dysuria [Sexually Active] : is not sexually active

## 2019-04-12 NOTE — PHYSICAL EXAM
[Alert] : alert [Awake] : awake [Soft] : soft [Oriented x3] : oriented to person, place, and time [Normal] : vagina [Uterine Adnexae] : were not tender and not enlarged [No Bleeding] : there was no active vaginal bleeding [Cystocele] : a cystocele [Rectocele] : a rectocele [Acute Distress] : no acute distress [Mass] : no breast mass [Nipple Discharge] : no nipple discharge [Axillary LAD] : no axillary lymphadenopathy [Tender] : non tender [FreeTextEntry4] : small cystocele and small rectocele

## 2019-05-21 RX ORDER — METFORMIN HYDROCHLORIDE 500 MG/1
500 TABLET, COATED ORAL
Qty: 60 | Refills: 5 | Status: DISCONTINUED | COMMUNITY
Start: 2019-01-14 | End: 2019-05-21

## 2019-08-15 ENCOUNTER — OUTPATIENT (OUTPATIENT)
Dept: OUTPATIENT SERVICES | Facility: HOSPITAL | Age: 64
LOS: 1 days | End: 2019-08-15

## 2019-08-15 ENCOUNTER — APPOINTMENT (OUTPATIENT)
Dept: INTERNAL MEDICINE | Facility: CLINIC | Age: 64
End: 2019-08-15

## 2019-08-15 VITALS — HEART RATE: 98 BPM | HEIGHT: 64 IN | WEIGHT: 198 LBS | OXYGEN SATURATION: 60 % | BODY MASS INDEX: 33.8 KG/M2

## 2019-08-15 VITALS — DIASTOLIC BLOOD PRESSURE: 76 MMHG | SYSTOLIC BLOOD PRESSURE: 120 MMHG | RESPIRATION RATE: 16 BRPM | HEART RATE: 78 BPM

## 2019-08-15 DIAGNOSIS — Z01.419 ENCOUNTER FOR GYNECOLOGICAL EXAMINATION (GENERAL) (ROUTINE) W/OUT ABNORMAL FINDINGS: ICD-10-CM

## 2019-08-15 DIAGNOSIS — Z98.89 OTHER SPECIFIED POSTPROCEDURAL STATES: Chronic | ICD-10-CM

## 2019-08-15 NOTE — HISTORY OF PRESENT ILLNESS
[FreeTextEntry1] : routine follow up [de-identified] : 65 yo F with PMH of obesity, HTN, preDM, medial meniscal tear s/p arthroscopic surgery, GERD p/w follow up of chronic medical problems including right knee pain. \par \par Current complains include b/l LE edema, which has been "forever."  At the end of the day, "they're terrible."  Pt endorses skin changes and numbness at night and also itches.  The skin is also getting thicker.  The edema resolves in the morning.  Pt has used compression stockings, but cannot use them because she cannot bend her R knee.  \par \par Regarding chronic R knee pain, it was very bad; pt wishes to return to an orthopedic surgeon and got a cortisone shot about a month ago.  She has had this for years.  She is able to ambulate without assistance, but experiences considerable discomfort when bearing weight or moving the joint. Tyenol and ibuprofen provide minimal relief. She had a cortisone injection about 1 year ago which did not relieve her pain. Occasionally experiences clicking or popping of the joint. Denies trauma to the knee. No fevers or chills.  It keeps her awake at night.  Pt denies swelling in the knee.  Pain is most significant when she bends her knee.  No recent falls.  No recent trauma.  No recent insect bites or overlying cellulitis; has not had recent bug bites.  \par \par Last time pt was here, she said that her urine was "shooting up on the toilet seat."  She had a gyn appointment, which was unrevealing.  However, it is still happening.  Pt states that she feels like she's not emptying her bladder.  Pt denies dysuria or hematuria; urine is clear without foul smell.  \par \par Pt was complaining of  breast spasm, but it has resolved.  Pt had recent ultrasound of breast, which was generally benign.  Pt thinks it was a result of her knee popping and hitting her breast on the floor.  There's no TTP, discharge, no skin changes/dimpling/swelling in any part of the breast.\par \par Regarding chronic nasal congestion, and states that "she can't find her throat" and scrapes until she "spits blood."  Pt uses Flonase, but feels like there's "something in the back of the throat," but occasionally has nose bleed.  She has a history of allergic rhinitis, but has not used any oral medications but has tried a nasal spray but upon review, she has been using it incorrectly.  Discharge is clear; cough has resolved; still has left-sided head and sinus pressure occasionally. Denies fever, chills, CP, SOB. This has been happening for years.  \par \par Concerning obesity and elevated A1c, she states that she tries to avoid caloric foods, but eats more bread and sweets than she would lik but is trying to cut down - she does not eat a whole loaf of bread in a day anymore and is trying to eat less rice. Her activity is limited by her knee pain. She had seen a nutritionist in the past and is aware of what changes she must make.\par \par ROS otherwise negative

## 2019-08-15 NOTE — ASSESSMENT
[FreeTextEntry1] : 63 yo F with PMH of obesity, HTN, preDM, L medial meniscal tear s/p arthroscopic surgery, GERD p/w follow up of chronic medical problems including right knee pain and occasional nasal congestion. \par \par LE edema - b/l, has skin changes c/w venous stasis dermatitis; legs are symmetrical; may also be due to venous insufficiency; amlodipine may also be contributing to LE edema as well; no recent surgeries and low risk for DVT\par - encouraged pt to use compression stockings when possible\par - offered pt a vascular surgery referral but due to lack of insurance, pt prefers to follow up with house ortho first given that her R knee pain is more pressing\par \par Right knee pain - denies erythema and swelling; pt does experience swelling in the summer.  Pt experiences "crackles" pt wakes up with morning stiffness; pain is bad at all times and there is no period of mitigation or exacerbation per the pt.  Pt does not take Tylenol or NSAIDs for pain.  Concern for osteoarthritic changes on last R knee x-ray which showed osteophytes in various compartments w/o fracture\par - Likely due to OA. Recommended conservative measures including weight loss and pain control. Patient has no insurance and cannot afford PT.  Pt has seen orthopedic surgery one time; had injection of synovial fluid mimetic, which improved her pain temporarily. \par - Tylenol recommended for pain\par - will refer to house ortho at pt's request so that continuity of care can be maintained\par - pt cannot afford MRI because she has no insurance; pt previously a NADYA case due to social determinants affecting her ability to pay for medications\par \par  Elevated A1c- Patient counseled about lifestyle modifications, A1c of 6; regarding her diet, she just "eats breads and all those snacks." \par - c/w Metformin 500 BID to assist w/ control and weight loss; pt is saying that she cannot afford it due to lack of insurance - referred to triage nurse for assistance w/ finding an affordable pharmacy\par - nutrition c/s\par - dietary counseling given again; pt lost 4 pounds as she is no longer eating bread and eats less rice\par \par HTN- BP well-controlled at 120/76\par - c/w Amlodipine 10\par - c/w HCTZ\par - encouraged pt to maintain low salt diet and take BP at home once a week; already has home BP machine; asked pt to keep a BP log at home and to bring in the log at next visit\par \par hyperlipidemia - LDL high at 145\par - pt currently taking Simvastatin 20; will switch pt to Lipitor 20 mg\par - further dietary counseling given\par \par Systolic heart murmur- not appreciated on my exam, TTE WNL, minimal mitral and tricuspid regurgitation\par - will continue to monitor; pt has no clinical stigmata of heart failure at this time\par \par Obesity- Counseled on lifestyle modifications; pt compliance is improving as she is no longer eating bread, eating less rice, and has lost 4 pounds \par - c/w dietary and lifestyle modifications\par \par Upwards urination - unclear etiology\par - UA from 1/2019 bland\par - pt seen by gyn - no acute cause identified, no abnormal pathology identified\par \par HCM- Patient defers flu vaccine\par - offered pt Shingrix vaccine, but she refused as she cannot afford it without insurance\par - no documented pneumonia vaccine; pt declined at this visit again due to lack of insurance and ability to afford\par - TDap in 2015\par - last documented pap in 2017 but pt states she had a pap smear when she saw gyn in 4/2019\par - colonoscopy in 2016 - single sigmoid diverticulum with internal hemorrhoids; next colonoscopy in 2026\par - Patient has referral for mammogram, UTD w/ PAP (1/2018) and colonoscopy (2016) both of which were normal\par - Hep C screen negative, declines HIV screen\par \par RTC in 5 weeks\par \par Deonte Becker, F5\par D/w Dr. Sanchez\par

## 2019-08-15 NOTE — REVIEW OF SYSTEMS
[Nasal Discharge] : nasal discharge [Joint Pain] : joint pain [Joint Stiffness] : joint stiffness [Negative] : Heme/Lymph [Fatigue] : no fatigue [Discharge] : no discharge [Vision Problems] : no vision problems [Hearing Loss] : no hearing loss [Sore Throat] : no sore throat [Chest Pain] : no chest pain [Palpitations] : no palpitations [Orthopnea] : no orthopnea [Shortness Of Breath] : no shortness of breath [Wheezing] : no wheezing [Cough] : no cough [Dyspnea on Exertion] : no dyspnea on exertion [Abdominal Pain] : no abdominal pain [Nausea] : no nausea [Vomiting] : no vomiting [Melena] : no melena [Dysuria] : no dysuria [Hematuria] : no hematuria [Muscle Pain] : no muscle pain [Back Pain] : no back pain [Itching] : no itching [Skin Rash] : no skin rash [Headache] : no headache [Dizziness] : no dizziness [Anxiety] : no anxiety [Depression] : no depression [Easy Bleeding] : no easy bleeding [Easy Bruising] : no easy bruising [FreeTextEntry9] : knee

## 2019-08-15 NOTE — PHYSICAL EXAM
[No Acute Distress] : no acute distress [Well Nourished] : well nourished [Well Developed] : well developed [Normal Sclera/Conjunctiva] : normal sclera/conjunctiva [PERRL] : pupils equal round and reactive to light [EOMI] : extraocular movements intact [Normal Outer Ear/Nose] : the outer ears and nose were normal in appearance [Normal Oropharynx] : the oropharynx was normal [No JVD] : no jugular venous distention [Supple] : supple [No Lymphadenopathy] : no lymphadenopathy [No Respiratory Distress] : no respiratory distress  [Clear to Auscultation] : lungs were clear to auscultation bilaterally [Normal Rate] : normal rate  [Regular Rhythm] : with a regular rhythm [Normal S1, S2] : normal S1 and S2 [II] : a grade 2 [Mid] : mid [Pedal Pulses Present] : the pedal pulses are present [No Extremity Clubbing/Cyanosis] : no extremity clubbing/cyanosis [Soft] : abdomen soft [Non Tender] : non-tender [Non-distended] : non-distended [No HSM] : no HSM [Normal Supraclavicular Nodes] : no supraclavicular lymphadenopathy [No CVA Tenderness] : no CVA  tenderness [Normal Anterior Cervical Nodes] : no anterior cervical lymphadenopathy [Grossly Normal Strength/Tone] : grossly normal strength/tone [No Spinal Tenderness] : no spinal tenderness [No Rash] : no rash [No Skin Lesions] : no skin lesions [Coordination Grossly Intact] : coordination grossly intact [No Focal Deficits] : no focal deficits [Normal Affect] : the affect was normal [Alert and Oriented x3] : oriented to person, place, and time [Normal Mood] : the mood was normal [Normal Insight/Judgement] : insight and judgment were intact [de-identified] : 2+ b/l LE edema [de-identified] : pain on passive and active ROM of right knee, popping and crepitus noted on examination

## 2019-08-16 DIAGNOSIS — J30.9 ALLERGIC RHINITIS, UNSPECIFIED: ICD-10-CM

## 2019-08-16 DIAGNOSIS — E66.9 OBESITY, UNSPECIFIED: ICD-10-CM

## 2019-08-16 DIAGNOSIS — Z01.419 ENCOUNTER FOR GYNECOLOGICAL EXAMINATION (GENERAL) (ROUTINE) WITHOUT ABNORMAL FINDINGS: ICD-10-CM

## 2019-08-16 DIAGNOSIS — I10 ESSENTIAL (PRIMARY) HYPERTENSION: ICD-10-CM

## 2019-08-16 DIAGNOSIS — Z00.00 ENCOUNTER FOR GENERAL ADULT MEDICAL EXAMINATION WITHOUT ABNORMAL FINDINGS: ICD-10-CM

## 2019-08-16 DIAGNOSIS — E78.5 HYPERLIPIDEMIA, UNSPECIFIED: ICD-10-CM

## 2019-08-16 DIAGNOSIS — R73.03 PREDIABETES: ICD-10-CM

## 2019-08-16 DIAGNOSIS — M25.569 PAIN IN UNSPECIFIED KNEE: ICD-10-CM

## 2019-08-16 DIAGNOSIS — R73.09 OTHER ABNORMAL GLUCOSE: ICD-10-CM

## 2019-08-16 DIAGNOSIS — K21.9 GASTRO-ESOPHAGEAL REFLUX DISEASE WITHOUT ESOPHAGITIS: ICD-10-CM

## 2019-09-04 ENCOUNTER — APPOINTMENT (OUTPATIENT)
Dept: ORTHOPEDIC SURGERY | Facility: HOSPITAL | Age: 64
End: 2019-09-04

## 2019-09-04 ENCOUNTER — OUTPATIENT (OUTPATIENT)
Dept: OUTPATIENT SERVICES | Facility: HOSPITAL | Age: 64
LOS: 1 days | End: 2019-09-04

## 2019-09-04 VITALS
BODY MASS INDEX: 33.82 KG/M2 | DIASTOLIC BLOOD PRESSURE: 63 MMHG | HEART RATE: 61 BPM | WEIGHT: 197 LBS | SYSTOLIC BLOOD PRESSURE: 125 MMHG

## 2019-09-04 DIAGNOSIS — M25.561 PAIN IN RIGHT KNEE: ICD-10-CM

## 2019-09-04 DIAGNOSIS — Z98.89 OTHER SPECIFIED POSTPROCEDURAL STATES: Chronic | ICD-10-CM

## 2019-09-19 ENCOUNTER — OUTPATIENT (OUTPATIENT)
Dept: OUTPATIENT SERVICES | Facility: HOSPITAL | Age: 64
LOS: 1 days | End: 2019-09-19

## 2019-09-19 ENCOUNTER — APPOINTMENT (OUTPATIENT)
Dept: INTERNAL MEDICINE | Facility: CLINIC | Age: 64
End: 2019-09-19

## 2019-09-19 VITALS — WEIGHT: 189 LBS | HEIGHT: 64 IN | BODY MASS INDEX: 32.27 KG/M2

## 2019-09-19 VITALS — RESPIRATION RATE: 16 BRPM | HEART RATE: 68 BPM | SYSTOLIC BLOOD PRESSURE: 118 MMHG | DIASTOLIC BLOOD PRESSURE: 80 MMHG

## 2019-09-19 DIAGNOSIS — G56.00 CARPAL TUNNEL SYNDROME, UNSPECIFIED UPPER LIMB: ICD-10-CM

## 2019-09-19 DIAGNOSIS — Z98.89 OTHER SPECIFIED POSTPROCEDURAL STATES: Chronic | ICD-10-CM

## 2019-09-19 NOTE — ASSESSMENT
[FreeTextEntry1] : 63 yo F with PMH of obesity, HTN, preDM, L medial meniscal tear s/p arthroscopic surgery, GERD p/w follow up of chronic medical problems including right knee pain and occasional nasal congestion. \par \par LE edema - b/l, has skin changes c/w venous stasis dermatitis; legs are symmetrical; may also be due to venous insufficiency; amlodipine may also be contributing to LE edema as well; no recent surgeries and low risk for DVT\par - encouraged pt to use compression stockings when possible but refused\par - offered pt a vascular surgery referral but due to lack of insurance, pt prefers to follow up with house ortho first given that her R knee pain is more pressing\par \par Right knee pain - denies erythema and swelling; pt does experience swelling in the summer.  Pt experiences "crackles" pt wakes up with morning stiffness; pain is bad at all times and there is no period of mitigation or exacerbation per the pt.  Pt does not take Tylenol or NSAIDs for pain.  Concern for osteoarthritic changes on last R knee x-ray which showed osteophytes in various compartments w/o fracture\par - Likely due to OA and obesity. Recommended conservative measures including weight loss and pain control. Patient has no insurance and cannot afford PT.  Pt has seen orthopedic surgery one time; had injection of synovial fluid mimetic, which improved her pain temporarily. \par - Tylenol recommended for pain\par - c/w ortho f/u; s/p hydrocortisone injection in R knee\par - pt cannot afford MRI because she has no insurance; pt previously a NADYA case due to social determinants affecting her ability to pay for medications\par \par  Elevated A1c- Patient counseled about lifestyle modifications, A1c of 6; regarding her diet, she just "eats breads and all those snacks." \par - c/w Metformin 500 BID to assist w/ control and weight loss\par - f/u w/ nutrition\par - dietary counseling given again; pt additional weight as she is no longer eating bread/sweets and eats less rice\par \par HTN- BP well-controlled at 118/80\par - c/w Amlodipine 10\par - c/w HCTZ\par - encouraged pt to maintain low salt diet and take BP at home once a week; already has home BP machine; asked pt to keep a BP log at home and to bring in the log at next visit\par \par hyperlipidemia - LDL high at 145\par - c/w lipitor 20\par - further dietary counseling given\par \par Obesity- Counseled on lifestyle modifications; pt compliance is improving as she is no longer eating bread, eating less rice, and has lost 4 pounds \par - c/w dietary and lifestyle modifications\par \par HCM\par - medication renewals given\par - Patient defers flu vaccine\par - offered pt Shingrix vaccine, but she refused as she cannot afford it without insurance\par - no documented pneumonia vaccine; pt declined at this visit again due to lack of insurance and ability to afford\par - TDap in 2015\par - last documented pap in 2017 but pt states she had a pap smear when she saw gyn in 4/2019\par - colonoscopy in 2016 - single sigmoid diverticulum with internal hemorrhoids; next colonoscopy in 2026\par - Patient has referral for mammogram, UTD w/ PAP (1/2018) and colonoscopy (2016) both of which were normal\par - Hep C screen negative, declines HIV screen\par \par RTC in 3 months\par \par Deonte Becker, F5\par D/w Dr. Sanchez\par

## 2019-09-19 NOTE — HISTORY OF PRESENT ILLNESS
[FreeTextEntry1] : routine follow up [de-identified] : 63 yo F with PMH of obesity, HTN, preDM, R medial meniscal tear s/p arthroscopic surgery, GERD p/w follow up of chronic medical problems including right knee pain. \par \par Current complains include b/l LE edema, which has been "forever."  At the end of the day, "they're terrible."  Pt endorses skin changes and numbness at night and also itches.  The skin is also getting thicker.  The edema resolves in the morning.  Pt has used compression stockings, but cannot use them because she cannot bend her R knee.  Currently, pt states that the swelling is improved.  \par \par Regarding chronic R knee pain, pt had recent cortisone injection into R knee and it is doing much better.  She is able to ambulate without assistance, but experiences considerable discomfort when bearing weight or moving the joint.  Pt denies swelling in the knee.  Pain is most significant when she bends her knee.  No recent falls.  No recent trauma. Ortho recommends PT but pt cannot afford it because she does not have any insurance; pt has repeat follow up in October.    \par \par Regarding chronic nasal congestion, and states that "she can't find her throat" and scrapes until she "spits blood."  Pt uses Flonase, but feels like there's "something in the back of the throat," but occasionally has nose bleed.  She has a history of allergic rhinitis, but has not used any oral medications but has tried a nasal spray but upon review, she has been using it incorrectly.  Discharge is clear; cough has resolved; still has left-sided head and sinus pressure occasionally. Denies fever, chills, CP, SOB. This has been happening for years.  Pt does not want to see ENT and states "there's some things we have to live with." \par \par Concerning obesity and elevated A1c, she states that she tries to avoid caloric foods, and has cut out bread and sweets; she eats rice once a week.  Pt still drinks condensed milk. Her activity is limited by her knee pain. She had seen a nutritionist in the past and is aware of what changes she must make.\par \par ROS otherwise negative

## 2019-09-19 NOTE — PHYSICAL EXAM
[No Acute Distress] : no acute distress [Well Nourished] : well nourished [Well Developed] : well developed [Normal Sclera/Conjunctiva] : normal sclera/conjunctiva [PERRL] : pupils equal round and reactive to light [EOMI] : extraocular movements intact [Normal Outer Ear/Nose] : the outer ears and nose were normal in appearance [Normal Oropharynx] : the oropharynx was normal [No JVD] : no jugular venous distention [Supple] : supple [No Lymphadenopathy] : no lymphadenopathy [No Respiratory Distress] : no respiratory distress  [Clear to Auscultation] : lungs were clear to auscultation bilaterally [Normal Rate] : normal rate  [Regular Rhythm] : with a regular rhythm [Normal S1, S2] : normal S1 and S2 [II] : a grade 2 [Mid] : mid [Pedal Pulses Present] : the pedal pulses are present [No Extremity Clubbing/Cyanosis] : no extremity clubbing/cyanosis [Soft] : abdomen soft [Non Tender] : non-tender [Non-distended] : non-distended [No HSM] : no HSM [Normal Supraclavicular Nodes] : no supraclavicular lymphadenopathy [Normal Anterior Cervical Nodes] : no anterior cervical lymphadenopathy [No CVA Tenderness] : no CVA  tenderness [No Spinal Tenderness] : no spinal tenderness [Grossly Normal Strength/Tone] : grossly normal strength/tone [No Rash] : no rash [No Skin Lesions] : no skin lesions [Coordination Grossly Intact] : coordination grossly intact [No Focal Deficits] : no focal deficits [Normal Affect] : the affect was normal [Alert and Oriented x3] : oriented to person, place, and time [Normal Mood] : the mood was normal [Normal Insight/Judgement] : insight and judgment were intact [de-identified] : 2+ b/l LE edema [de-identified] : pain on passive and active ROM of right knee, popping and crepitus noted on examination

## 2019-10-16 ENCOUNTER — OUTPATIENT (OUTPATIENT)
Dept: OUTPATIENT SERVICES | Facility: HOSPITAL | Age: 64
LOS: 1 days | End: 2019-10-16

## 2019-10-16 ENCOUNTER — APPOINTMENT (OUTPATIENT)
Dept: ORTHOPEDIC SURGERY | Facility: HOSPITAL | Age: 64
End: 2019-10-16

## 2019-10-16 VITALS
DIASTOLIC BLOOD PRESSURE: 87 MMHG | BODY MASS INDEX: 32.61 KG/M2 | SYSTOLIC BLOOD PRESSURE: 156 MMHG | HEART RATE: 60 BPM | WEIGHT: 190 LBS

## 2019-10-16 DIAGNOSIS — Z98.89 OTHER SPECIFIED POSTPROCEDURAL STATES: Chronic | ICD-10-CM

## 2019-10-17 DIAGNOSIS — M17.0 BILATERAL PRIMARY OSTEOARTHRITIS OF KNEE: ICD-10-CM

## 2019-12-11 ENCOUNTER — APPOINTMENT (OUTPATIENT)
Dept: OBGYN | Facility: CLINIC | Age: 64
End: 2019-12-11
Payer: COMMERCIAL

## 2019-12-11 ENCOUNTER — OUTPATIENT (OUTPATIENT)
Dept: OUTPATIENT SERVICES | Facility: HOSPITAL | Age: 64
LOS: 1 days | End: 2019-12-11
Payer: SELF-PAY

## 2019-12-11 VITALS
SYSTOLIC BLOOD PRESSURE: 132 MMHG | WEIGHT: 182 LBS | HEIGHT: 64 IN | BODY MASS INDEX: 31.07 KG/M2 | DIASTOLIC BLOOD PRESSURE: 82 MMHG

## 2019-12-11 DIAGNOSIS — N76.0 ACUTE VAGINITIS: ICD-10-CM

## 2019-12-11 DIAGNOSIS — Z98.89 OTHER SPECIFIED POSTPROCEDURAL STATES: Chronic | ICD-10-CM

## 2019-12-11 PROCEDURE — G0463: CPT

## 2019-12-11 PROCEDURE — 99213 OFFICE O/P EST LOW 20 MIN: CPT | Mod: NC

## 2019-12-16 NOTE — PHYSICAL EXAM
[Normal] : uterus [No Bleeding] : there was no active vaginal bleeding [Uterine Adnexae] : were not tender and not enlarged [FreeTextEntry7] : uterine prolapse

## 2019-12-24 DIAGNOSIS — N81.4 UTEROVAGINAL PROLAPSE, UNSPECIFIED: ICD-10-CM

## 2020-01-02 ENCOUNTER — LABORATORY RESULT (OUTPATIENT)
Age: 65
End: 2020-01-02

## 2020-01-02 ENCOUNTER — APPOINTMENT (OUTPATIENT)
Dept: INTERNAL MEDICINE | Facility: CLINIC | Age: 65
End: 2020-01-02

## 2020-01-02 ENCOUNTER — OUTPATIENT (OUTPATIENT)
Dept: OUTPATIENT SERVICES | Facility: HOSPITAL | Age: 65
LOS: 1 days | End: 2020-01-02

## 2020-01-02 VITALS
HEART RATE: 71 BPM | SYSTOLIC BLOOD PRESSURE: 126 MMHG | OXYGEN SATURATION: 99 % | WEIGHT: 185.06 LBS | DIASTOLIC BLOOD PRESSURE: 74 MMHG | HEIGHT: 64 IN | BODY MASS INDEX: 31.59 KG/M2

## 2020-01-02 DIAGNOSIS — Z98.89 OTHER SPECIFIED POSTPROCEDURAL STATES: Chronic | ICD-10-CM

## 2020-01-02 DIAGNOSIS — R01.1 CARDIAC MURMUR, UNSPECIFIED: ICD-10-CM

## 2020-01-02 LAB
ALBUMIN SERPL ELPH-MCNC: 4.6 G/DL — SIGNIFICANT CHANGE UP (ref 3.3–5)
ALP SERPL-CCNC: 93 U/L — SIGNIFICANT CHANGE UP (ref 40–120)
ALT FLD-CCNC: 17 U/L — SIGNIFICANT CHANGE UP (ref 4–33)
ANION GAP SERPL CALC-SCNC: 12 MMO/L — SIGNIFICANT CHANGE UP (ref 7–14)
AST SERPL-CCNC: 24 U/L — SIGNIFICANT CHANGE UP (ref 4–32)
BASOPHILS # BLD AUTO: 0.04 K/UL — SIGNIFICANT CHANGE UP (ref 0–0.2)
BASOPHILS NFR BLD AUTO: 0.7 % — SIGNIFICANT CHANGE UP (ref 0–2)
BILIRUB SERPL-MCNC: 0.3 MG/DL — SIGNIFICANT CHANGE UP (ref 0.2–1.2)
BUN SERPL-MCNC: 11 MG/DL — SIGNIFICANT CHANGE UP (ref 7–23)
CALCIUM SERPL-MCNC: 10.4 MG/DL — SIGNIFICANT CHANGE UP (ref 8.4–10.5)
CHLORIDE SERPL-SCNC: 102 MMOL/L — SIGNIFICANT CHANGE UP (ref 98–107)
CHOLEST SERPL-MCNC: 255 MG/DL — HIGH (ref 120–199)
CO2 SERPL-SCNC: 27 MMOL/L — SIGNIFICANT CHANGE UP (ref 22–31)
CREAT SERPL-MCNC: 0.6 MG/DL — SIGNIFICANT CHANGE UP (ref 0.5–1.3)
EOSINOPHIL # BLD AUTO: 0.06 K/UL — SIGNIFICANT CHANGE UP (ref 0–0.5)
EOSINOPHIL NFR BLD AUTO: 1 % — SIGNIFICANT CHANGE UP (ref 0–6)
GLUCOSE SERPL-MCNC: 94 MG/DL — SIGNIFICANT CHANGE UP (ref 70–99)
HBA1C BLD-MCNC: 5.9 % — HIGH (ref 4–5.6)
HCT VFR BLD CALC: 41.8 % — SIGNIFICANT CHANGE UP (ref 34.5–45)
HDLC SERPL-MCNC: 88 MG/DL — HIGH (ref 45–65)
HGB BLD-MCNC: 12.9 G/DL — SIGNIFICANT CHANGE UP (ref 11.5–15.5)
IMM GRANULOCYTES NFR BLD AUTO: 0.5 % — SIGNIFICANT CHANGE UP (ref 0–1.5)
LIPID PNL WITH DIRECT LDL SERPL: 154 MG/DL — SIGNIFICANT CHANGE UP
LYMPHOCYTES # BLD AUTO: 3.03 K/UL — SIGNIFICANT CHANGE UP (ref 1–3.3)
LYMPHOCYTES # BLD AUTO: 49.3 % — HIGH (ref 13–44)
MAGNESIUM SERPL-MCNC: 2 MG/DL — SIGNIFICANT CHANGE UP (ref 1.6–2.6)
MCHC RBC-ENTMCNC: 25.7 PG — LOW (ref 27–34)
MCHC RBC-ENTMCNC: 30.9 % — LOW (ref 32–36)
MCV RBC AUTO: 83.3 FL — SIGNIFICANT CHANGE UP (ref 80–100)
MONOCYTES # BLD AUTO: 0.41 K/UL — SIGNIFICANT CHANGE UP (ref 0–0.9)
MONOCYTES NFR BLD AUTO: 6.7 % — SIGNIFICANT CHANGE UP (ref 2–14)
NEUTROPHILS # BLD AUTO: 2.57 K/UL — SIGNIFICANT CHANGE UP (ref 1.8–7.4)
NEUTROPHILS NFR BLD AUTO: 41.8 % — LOW (ref 43–77)
NRBC # FLD: 0 K/UL — SIGNIFICANT CHANGE UP (ref 0–0)
PHOSPHATE SERPL-MCNC: 2.6 MG/DL — SIGNIFICANT CHANGE UP (ref 2.5–4.5)
PLATELET # BLD AUTO: 337 K/UL — SIGNIFICANT CHANGE UP (ref 150–400)
PMV BLD: 10 FL — SIGNIFICANT CHANGE UP (ref 7–13)
POTASSIUM SERPL-MCNC: 3.8 MMOL/L — SIGNIFICANT CHANGE UP (ref 3.5–5.3)
POTASSIUM SERPL-SCNC: 3.8 MMOL/L — SIGNIFICANT CHANGE UP (ref 3.5–5.3)
PROT SERPL-MCNC: 8.1 G/DL — SIGNIFICANT CHANGE UP (ref 6–8.3)
RBC # BLD: 5.02 M/UL — SIGNIFICANT CHANGE UP (ref 3.8–5.2)
RBC # FLD: 14.4 % — SIGNIFICANT CHANGE UP (ref 10.3–14.5)
SODIUM SERPL-SCNC: 141 MMOL/L — SIGNIFICANT CHANGE UP (ref 135–145)
TRIGL SERPL-MCNC: 85 MG/DL — SIGNIFICANT CHANGE UP (ref 10–149)
WBC # BLD: 6.14 K/UL — SIGNIFICANT CHANGE UP (ref 3.8–10.5)
WBC # FLD AUTO: 6.14 K/UL — SIGNIFICANT CHANGE UP (ref 3.8–10.5)

## 2020-01-02 RX ORDER — OMEPRAZOLE 40 MG/1
40 CAPSULE, DELAYED RELEASE ORAL TWICE DAILY
Qty: 60 | Refills: 0 | Status: DISCONTINUED | COMMUNITY
Start: 2019-01-14 | End: 2020-01-02

## 2020-01-02 NOTE — REVIEW OF SYSTEMS
[Joint Stiffness] : joint stiffness [Negative] : Heme/Lymph [Fatigue] : no fatigue [Discharge] : no discharge [Vision Problems] : no vision problems [Hearing Loss] : no hearing loss [Nasal Discharge] : no nasal discharge [Sore Throat] : no sore throat [Chest Pain] : no chest pain [Palpitations] : no palpitations [Shortness Of Breath] : no shortness of breath [Orthopnea] : no orthopnea [Wheezing] : no wheezing [Cough] : no cough [Dyspnea on Exertion] : no dyspnea on exertion [Abdominal Pain] : no abdominal pain [Nausea] : no nausea [Vomiting] : no vomiting [Melena] : no melena [Dysuria] : no dysuria [Hematuria] : no hematuria [Joint Pain] : no joint pain [Muscle Pain] : no muscle pain [Back Pain] : no back pain [Itching] : no itching [Skin Rash] : no skin rash [Headache] : no headache [Dizziness] : no dizziness [Anxiety] : no anxiety [Depression] : no depression [Easy Bleeding] : no easy bleeding [Easy Bruising] : no easy bruising [FreeTextEntry9] : R knee

## 2020-01-02 NOTE — ASSESSMENT
[FreeTextEntry1] : 65 yo F with PMH of obesity, HTN, preDM, L medial meniscal tear s/p arthroscopic surgery, GERD p/w follow up of chronic medical problems including right knee pain and occasional nasal congestion. \par \par LE edema - b/l, has skin changes c/w venous stasis dermatitis; legs are symmetrical; may also be due to venous insufficiency; amlodipine may also be contributing to LE edema as well; no recent surgeries and low risk for DVT\par - encouraged pt to use compression stockings when possible but refused\par - offered pt a vascular surgery referral but due to lack of insurance, pt prefers to follow up with house ortho first given that her R knee pain is more pressing\par \par Right knee pain - likely OA c/b obesity; improved w/ recent steroid injection by ortho. Pt does not take Tylenol or NSAIDs for pain.  Concern for osteoarthritic changes on last R knee x-ray which showed osteophytes in various compartments w/o fracture\par - Likely due to OA and obesity. Recommended conservative measures including weight loss and pain control. Patient has no insurance and cannot afford PT.  \par - Tylenol recommended for pain\par - c/w ortho f/u; s/p hydrocortisone injection in R knee\par - will offer [facility] but pt declined due to cost as she has no insurance\par - pt cannot afford MRI because she has no insurance; pt previously a NADYA case due to social determinants affecting her ability to pay for medications\par \par  Elevated A1c- Patient counseled about lifestyle modifications, A1c of 6\par - will check A1c, urine microalbumin, lipid profile\par - c/w Metformin 500 BID to assist w/ control and weight loss\par - f/u w/ nutrition\par - dietary counseling given again; pt additional weight as she is no longer eating bread/sweets and eats less rice\par \par HTN- BP well-controlled at\par - c/w Amlodipine 10\par - c/w HCTZ\par - encouraged pt to maintain low salt diet and take BP at home once a week; already has home BP machine; asked pt to keep a BP log at home and to bring in the log at next visit\par \par hyperlipidemia \par - check lipid profile today\par - c/w simvastatin 20\par - further dietary counseling given\par \par Stage 2 uterine prolapse - stable\par - c/w GYN follow up\par \par Obesity- Counseled on lifestyle modifications; pt compliance is improving as she is no longer eating bread, eating less rice and stopped drinking juice and soda\par - c/w dietary and lifestyle modifications\par \par HCM\par - medication renewals given\par - Patient defers flu vaccine; is aware of the risks of getting the flu\par - offered pt Shingrix vaccine, but she refused as she cannot afford it without insurance\par - no documented pneumonia vaccine; pt declined at this visit again due to lack of insurance and ability to afford\par - TDap in 2015\par - Mammo on 4/19 was BiRads 2 - will give referral today for 2020\par - Pap - HPV negative on 5/19\par - colonoscopy in 2016 - single sigmoid diverticulum with internal hemorrhoids; next colonoscopy in 2026\par - Hep C screen negative, declines HIV screen\par \par RTC in 3 months\par \par Deonte Becker, F5\par D/w Dr. Ford\par

## 2020-01-02 NOTE — HISTORY OF PRESENT ILLNESS
[FreeTextEntry1] : routine follow up [de-identified] : 63 yo F with PMH of obesity, HTN, preDM, R medial meniscal tear s/p arthroscopic surgery, GERD p/w follow up of chronic medical problems including right knee pain presents for a follow up.  \par \par Patient has no active complaints.  Pt recently went to gyn; was diagnosed uterine prolapse stage 2.  No pessary was placed; pt is going to be seen again in April.  She was referred to a gyn-urologist.  Pt states it's uncomfortable but is not painful; no blood with urination but pt has urge to urinate.  Denies foul smelling urine.  \par \par Regarding her LE swelling, pt says that the swelling is "not so bad."  It is better in the winter.  the LE edema is not painful.  Pt tries to use compression stockings, but is limited due to discomfort.  \par \par Regarding her chronic R knee pain, it is better.  Pt went to ortho and injected corticosteroids.  Ortho does not believe her R knee pain is meniscal and that it is due to OA; recommended [facility] but cannot afford it.  Pt walks w/o assistance and has no discomfort since the cortisone shot.  Denies knee swelling and erythema.  Previously, pain was with R knee bending, but is now much improved.  No falls or trauma.   \par \par Regarding chronic nasal congestion, she's okay until she lies down.  Pt intermittently uses flonase because she has epistaxis if used too much. She has a history of allergic rhinitis, but has not used any oral medications; pt educated on last visit how to properly use nasal spray. Pt has a chronic post-nasal drip.  Denies fever, chills, CP, SOB. This has been happening for years.  Pt does not want to see ENT.\par \par Concerning obesity and elevated A1c, she states that she "still cheats a little but not viciously," tries to avoid caloric foods, and has cut out bread and sweets; she eats rice once a week.  Pt no longer deep fries her french fries; she bakes them.  Pt cut out juice and sodas. Pt still drinks condensed milk. Her activity is limited by her knee pain in R knee when knee hurts her. She had seen a nutritionist in the past and is aware of what changes she must make.\par \par ROS otherwise negative

## 2020-01-02 NOTE — PHYSICAL EXAM
[No Acute Distress] : no acute distress [Well Nourished] : well nourished [Well Developed] : well developed [Normal Sclera/Conjunctiva] : normal sclera/conjunctiva [PERRL] : pupils equal round and reactive to light [EOMI] : extraocular movements intact [Normal Outer Ear/Nose] : the outer ears and nose were normal in appearance [Normal Oropharynx] : the oropharynx was normal [No Lymphadenopathy] : no lymphadenopathy [Supple] : supple [No JVD] : no jugular venous distention [Clear to Auscultation] : lungs were clear to auscultation bilaterally [No Respiratory Distress] : no respiratory distress  [Normal Rate] : normal rate  [Regular Rhythm] : with a regular rhythm [Normal S1, S2] : normal S1 and S2 [II] : a grade 2 [Mid] : mid [Pedal Pulses Present] : the pedal pulses are present [No Extremity Clubbing/Cyanosis] : no extremity clubbing/cyanosis [Soft] : abdomen soft [Non-distended] : non-distended [Non Tender] : non-tender [No HSM] : no HSM [Normal Anterior Cervical Nodes] : no anterior cervical lymphadenopathy [Normal Supraclavicular Nodes] : no supraclavicular lymphadenopathy [No CVA Tenderness] : no CVA  tenderness [Grossly Normal Strength/Tone] : grossly normal strength/tone [No Spinal Tenderness] : no spinal tenderness [No Rash] : no rash [No Focal Deficits] : no focal deficits [Coordination Grossly Intact] : coordination grossly intact [No Skin Lesions] : no skin lesions [Alert and Oriented x3] : oriented to person, place, and time [Normal Affect] : the affect was normal [Normal Insight/Judgement] : insight and judgment were intact [Normal Mood] : the mood was normal [No Joint Swelling] : no joint swelling [de-identified] : 2+ b/l LE edema [de-identified] : crepitus and popping sound of R knee; no erythema

## 2020-01-03 ENCOUNTER — OTHER (OUTPATIENT)
Age: 65
End: 2020-01-03

## 2020-01-03 DIAGNOSIS — K21.9 GASTRO-ESOPHAGEAL REFLUX DISEASE WITHOUT ESOPHAGITIS: ICD-10-CM

## 2020-01-03 DIAGNOSIS — E78.5 HYPERLIPIDEMIA, UNSPECIFIED: ICD-10-CM

## 2020-01-03 DIAGNOSIS — I10 ESSENTIAL (PRIMARY) HYPERTENSION: ICD-10-CM

## 2020-01-03 DIAGNOSIS — R73.09 OTHER ABNORMAL GLUCOSE: ICD-10-CM

## 2020-01-03 DIAGNOSIS — R73.03 PREDIABETES: ICD-10-CM

## 2020-01-03 DIAGNOSIS — Z00.00 ENCOUNTER FOR GENERAL ADULT MEDICAL EXAMINATION WITHOUT ABNORMAL FINDINGS: ICD-10-CM

## 2020-01-03 DIAGNOSIS — R01.1 CARDIAC MURMUR, UNSPECIFIED: ICD-10-CM

## 2020-01-03 DIAGNOSIS — M25.569 PAIN IN UNSPECIFIED KNEE: ICD-10-CM

## 2020-01-03 LAB
CREAT UR-MCNC: 90 MG/DL — SIGNIFICANT CHANGE UP
MICROALBUMIN UR-MCNC: < 1.2 MG/DL — SIGNIFICANT CHANGE UP

## 2020-04-14 ENCOUNTER — APPOINTMENT (OUTPATIENT)
Dept: INTERNAL MEDICINE | Facility: CLINIC | Age: 65
End: 2020-04-14

## 2020-04-24 ENCOUNTER — APPOINTMENT (OUTPATIENT)
Dept: OBGYN | Facility: CLINIC | Age: 65
End: 2020-04-24

## 2020-10-06 ENCOUNTER — LABORATORY RESULT (OUTPATIENT)
Age: 65
End: 2020-10-06

## 2020-10-06 ENCOUNTER — APPOINTMENT (OUTPATIENT)
Dept: INTERNAL MEDICINE | Facility: CLINIC | Age: 65
End: 2020-10-06

## 2020-10-06 ENCOUNTER — OUTPATIENT (OUTPATIENT)
Dept: OUTPATIENT SERVICES | Facility: HOSPITAL | Age: 65
LOS: 1 days | End: 2020-10-06

## 2020-10-06 VITALS — TEMPERATURE: 98.2 F

## 2020-10-06 VITALS
HEART RATE: 69 BPM | DIASTOLIC BLOOD PRESSURE: 82 MMHG | OXYGEN SATURATION: 89 % | WEIGHT: 189 LBS | BODY MASS INDEX: 32.27 KG/M2 | HEIGHT: 64 IN | SYSTOLIC BLOOD PRESSURE: 124 MMHG

## 2020-10-06 DIAGNOSIS — M25.569 PAIN IN UNSPECIFIED KNEE: ICD-10-CM

## 2020-10-06 DIAGNOSIS — Z98.89 OTHER SPECIFIED POSTPROCEDURAL STATES: Chronic | ICD-10-CM

## 2020-10-06 DIAGNOSIS — E66.9 OBESITY, UNSPECIFIED: ICD-10-CM

## 2020-10-06 DIAGNOSIS — J30.9 ALLERGIC RHINITIS, UNSPECIFIED: ICD-10-CM

## 2020-10-06 LAB
ALBUMIN SERPL ELPH-MCNC: 4.8 G/DL — SIGNIFICANT CHANGE UP (ref 3.3–5)
ALP SERPL-CCNC: 86 U/L — SIGNIFICANT CHANGE UP (ref 40–120)
ALT FLD-CCNC: 12 U/L — SIGNIFICANT CHANGE UP (ref 4–33)
ANION GAP SERPL CALC-SCNC: 11 MMO/L — SIGNIFICANT CHANGE UP (ref 7–14)
AST SERPL-CCNC: 21 U/L — SIGNIFICANT CHANGE UP (ref 4–32)
BASOPHILS # BLD AUTO: 0.05 K/UL — SIGNIFICANT CHANGE UP (ref 0–0.2)
BASOPHILS NFR BLD AUTO: 0.9 % — SIGNIFICANT CHANGE UP (ref 0–2)
BILIRUB SERPL-MCNC: 0.2 MG/DL — SIGNIFICANT CHANGE UP (ref 0.2–1.2)
BUN SERPL-MCNC: 12 MG/DL — SIGNIFICANT CHANGE UP (ref 7–23)
CALCIUM SERPL-MCNC: 10.1 MG/DL — SIGNIFICANT CHANGE UP (ref 8.4–10.5)
CHLORIDE SERPL-SCNC: 102 MMOL/L — SIGNIFICANT CHANGE UP (ref 98–107)
CHOLEST SERPL-MCNC: 196 MG/DL — SIGNIFICANT CHANGE UP (ref 120–199)
CO2 SERPL-SCNC: 27 MMOL/L — SIGNIFICANT CHANGE UP (ref 22–31)
CREAT SERPL-MCNC: 0.56 MG/DL — SIGNIFICANT CHANGE UP (ref 0.5–1.3)
EOSINOPHIL # BLD AUTO: 0.09 K/UL — SIGNIFICANT CHANGE UP (ref 0–0.5)
EOSINOPHIL NFR BLD AUTO: 1.6 % — SIGNIFICANT CHANGE UP (ref 0–6)
GLUCOSE SERPL-MCNC: 93 MG/DL — SIGNIFICANT CHANGE UP (ref 70–99)
HBA1C BLD-MCNC: 5.8 % — HIGH (ref 4–5.6)
HCT VFR BLD CALC: 40.1 % — SIGNIFICANT CHANGE UP (ref 34.5–45)
HDLC SERPL-MCNC: 107 MG/DL — HIGH (ref 45–65)
HGB BLD-MCNC: 12.5 G/DL — SIGNIFICANT CHANGE UP (ref 11.5–15.5)
IMM GRANULOCYTES NFR BLD AUTO: 0.4 % — SIGNIFICANT CHANGE UP (ref 0–1.5)
LIPID PNL WITH DIRECT LDL SERPL: 87 MG/DL — SIGNIFICANT CHANGE UP
LYMPHOCYTES # BLD AUTO: 2.69 K/UL — SIGNIFICANT CHANGE UP (ref 1–3.3)
LYMPHOCYTES # BLD AUTO: 47.4 % — HIGH (ref 13–44)
MAGNESIUM SERPL-MCNC: 2 MG/DL — SIGNIFICANT CHANGE UP (ref 1.6–2.6)
MCHC RBC-ENTMCNC: 25.4 PG — LOW (ref 27–34)
MCHC RBC-ENTMCNC: 31.2 % — LOW (ref 32–36)
MCV RBC AUTO: 81.5 FL — SIGNIFICANT CHANGE UP (ref 80–100)
MONOCYTES # BLD AUTO: 0.42 K/UL — SIGNIFICANT CHANGE UP (ref 0–0.9)
MONOCYTES NFR BLD AUTO: 7.4 % — SIGNIFICANT CHANGE UP (ref 2–14)
NEUTROPHILS # BLD AUTO: 2.41 K/UL — SIGNIFICANT CHANGE UP (ref 1.8–7.4)
NEUTROPHILS NFR BLD AUTO: 42.3 % — LOW (ref 43–77)
NRBC # FLD: 0 K/UL — SIGNIFICANT CHANGE UP (ref 0–0)
PHOSPHATE SERPL-MCNC: 2.9 MG/DL — SIGNIFICANT CHANGE UP (ref 2.5–4.5)
PLATELET # BLD AUTO: 242 K/UL — SIGNIFICANT CHANGE UP (ref 150–400)
PMV BLD: 11.7 FL — SIGNIFICANT CHANGE UP (ref 7–13)
POTASSIUM SERPL-MCNC: 3.7 MMOL/L — SIGNIFICANT CHANGE UP (ref 3.5–5.3)
POTASSIUM SERPL-SCNC: 3.7 MMOL/L — SIGNIFICANT CHANGE UP (ref 3.5–5.3)
PROT SERPL-MCNC: 8 G/DL — SIGNIFICANT CHANGE UP (ref 6–8.3)
RBC # BLD: 4.92 M/UL — SIGNIFICANT CHANGE UP (ref 3.8–5.2)
RBC # FLD: 14.4 % — SIGNIFICANT CHANGE UP (ref 10.3–14.5)
SODIUM SERPL-SCNC: 140 MMOL/L — SIGNIFICANT CHANGE UP (ref 135–145)
TRIGL SERPL-MCNC: 85 MG/DL — SIGNIFICANT CHANGE UP (ref 10–149)
WBC # BLD: 5.68 K/UL — SIGNIFICANT CHANGE UP (ref 3.8–10.5)
WBC # FLD AUTO: 5.68 K/UL — SIGNIFICANT CHANGE UP (ref 3.8–10.5)

## 2020-10-06 RX ORDER — SIMVASTATIN 20 MG/1
20 TABLET, FILM COATED ORAL
Qty: 30 | Refills: 5 | Status: DISCONTINUED | COMMUNITY
Start: 2019-03-29 | End: 2020-10-06

## 2020-10-06 NOTE — REVIEW OF SYSTEMS
[Nasal Discharge] : nasal discharge [Postnasal Drip] : postnasal drip [Palpitations] : palpitations [Lower Ext Edema] : lower extremity edema [Negative] : Heme/Lymph [Earache] : no earache [Hearing Loss] : no hearing loss [Nosebleed] : no nosebleeds [Hoarseness] : no hoarseness [Chest Pain] : no chest pain [Leg Claudication] : no leg claudication [Orthopnea] : no orthopnea [Paroxysmal Nocturnal Dyspnea] : no paroxysmal nocturnal dyspnea [Dysuria] : no dysuria [Hematuria] : no hematuria [FreeTextEntry8] : uterine prolapse

## 2020-10-06 NOTE — ADDENDUM
[FreeTextEntry1] : reviewed with resident via telephone due to covid18 Agree with residents evaluation and plan [email or web address]

## 2020-10-06 NOTE — PHYSICAL EXAM
[Normal] : normal gait, coordination grossly intact, no focal deficits [de-identified] : Clear TMs b/l, no erythema, nobulging; Nose: (+) b/l erythematous/hypertrophic turbinates, no bleeding, no polyps seen [de-identified] : Normal rate, regular rhythm, s1s2, (+) grade II mid systolic murmur at RUSB [de-identified] : Distal pulses 2+, (+) b/l LE 1+ pitting edema R>L [de-identified] : no rashes, no lesions, dry and warm

## 2020-10-06 NOTE — HISTORY OF PRESENT ILLNESS
Patient admitted with infection to left knee. Per Op note dated 03/25/20 1050 documentation of debridement / I&D stating \"The bursal area had a thick fibrous buildup of tissue which was removed utilizing a curette only and scraping the capsule of the knee and the under-edges of the skin and subcu tissues. \" To accurately reflect the procedure performed please addend the note to include: ? Excisional debridement = cutting off or away- without replacement- devitalized tissue, necrosis or slough ? Non-excisional debridement = brushing, irrigating, scrubbing or washing of devitalized tissue, necrosis or slough The medical record reflects the following: 
 
  Risk Factors: Left knee infection status post knee replacement Clinical Indicators: \"The bursal area had a thick fibrous buildup of tissue which was removed utilizing a curette only and scraping the capsule of the knee and the under-edges of the skin and subcu tissues. \" Treatment: Received I&D to left knee Thank you, Rossana Winters, RN, BSN, [address] [place] [address] 
[phone] [FreeTextEntry1] : Wellness Visit/Follow Up [de-identified] : 64 yo F with h/o HTN, obesity, preDM, chronic right knee pain, uterine prolapse, presents for her annual visit. She denies any new complaints but says that she is here for follow up of her chronic medical problems. \par \par She is due to see her GYN provider for further management of her uterine prolapse (stage 2) but says that her symptoms are stable and include mild discomfort and urinary urgency but she denies any dysuria, hematuria or foul smelling urine.\par \par Her chronic right knee pain is moderately improved. She states that she has been able to tolerate pain without significant use of OTC medication. She has not followed up with Ortho since last January due to the pandemic. But she says that she will see her regular provider if needed for additional injections. She is not interested in pursuing PT 2/2 out of pocket cost. \par \par Her b/l lower extremity edema is improved. She states that she doesn't wear compression socks because "they are too tight" for her to tolerate but she believes the swelling is much improved compared to past visits. \par \par She continues to have nasal congestions and continues to intermittently uses flonase but does not think it helps much. She does not want  referral to ENT. She denies any recent illness, fevers, chills, CP, SOB, cough or other symptoms. \par \par She is most concerned about her hemeglobin a1c, prediabetes diagnosis. She stakes her metformin as indicated but says that she gets "palpitations" when taking her PM dose every day. She otherwise notes improvement in her diet. She states that her activity is somewhat limited by her right knee pain and "lockdown in this pandemic" but she is aware of what changes she should be trying to make. \par \par She otherwise denies any CP, SOB, cough, headaches, dizziness, lightheadedness, recent injuries, illnesses or any other complaints at this time. ROS otherwise negative.

## 2020-10-07 DIAGNOSIS — M25.569 PAIN IN UNSPECIFIED KNEE: ICD-10-CM

## 2020-10-07 DIAGNOSIS — I10 ESSENTIAL (PRIMARY) HYPERTENSION: ICD-10-CM

## 2020-10-07 DIAGNOSIS — J30.9 ALLERGIC RHINITIS, UNSPECIFIED: ICD-10-CM

## 2020-10-07 DIAGNOSIS — R73.09 OTHER ABNORMAL GLUCOSE: ICD-10-CM

## 2020-10-07 DIAGNOSIS — E78.5 HYPERLIPIDEMIA, UNSPECIFIED: ICD-10-CM

## 2020-10-07 DIAGNOSIS — K21.9 GASTRO-ESOPHAGEAL REFLUX DISEASE WITHOUT ESOPHAGITIS: ICD-10-CM

## 2020-10-07 DIAGNOSIS — M79.89 OTHER SPECIFIED SOFT TISSUE DISORDERS: ICD-10-CM

## 2020-10-07 DIAGNOSIS — E66.9 OBESITY, UNSPECIFIED: ICD-10-CM

## 2020-10-07 DIAGNOSIS — R73.03 PREDIABETES: ICD-10-CM

## 2020-12-21 PROBLEM — Z01.419 ENCOUNTER FOR ANNUAL ROUTINE GYNECOLOGICAL EXAMINATION: Status: RESOLVED | Noted: 2017-03-29 | Resolved: 2020-12-21

## 2021-01-05 ENCOUNTER — RX RENEWAL (OUTPATIENT)
Age: 66
End: 2021-01-05

## 2021-02-22 ENCOUNTER — OUTPATIENT (OUTPATIENT)
Dept: OUTPATIENT SERVICES | Facility: HOSPITAL | Age: 66
LOS: 1 days | End: 2021-02-22

## 2021-02-22 ENCOUNTER — APPOINTMENT (OUTPATIENT)
Dept: INTERNAL MEDICINE | Facility: CLINIC | Age: 66
End: 2021-02-22

## 2021-02-22 VITALS
DIASTOLIC BLOOD PRESSURE: 75 MMHG | HEIGHT: 64 IN | BODY MASS INDEX: 32.61 KG/M2 | WEIGHT: 191 LBS | OXYGEN SATURATION: 90 % | HEART RATE: 73 BPM | SYSTOLIC BLOOD PRESSURE: 130 MMHG

## 2021-02-22 VITALS — TEMPERATURE: 97.2 F

## 2021-02-22 DIAGNOSIS — R10.12 LEFT UPPER QUADRANT PAIN: ICD-10-CM

## 2021-02-22 DIAGNOSIS — Z98.89 OTHER SPECIFIED POSTPROCEDURAL STATES: Chronic | ICD-10-CM

## 2021-02-23 VITALS — OXYGEN SATURATION: 100 %

## 2021-02-23 DIAGNOSIS — Z00.00 ENCOUNTER FOR GENERAL ADULT MEDICAL EXAMINATION WITHOUT ABNORMAL FINDINGS: ICD-10-CM

## 2021-02-23 DIAGNOSIS — R73.03 PREDIABETES: ICD-10-CM

## 2021-02-23 DIAGNOSIS — R10.12 LEFT UPPER QUADRANT PAIN: ICD-10-CM

## 2021-02-23 DIAGNOSIS — N81.4 UTEROVAGINAL PROLAPSE, UNSPECIFIED: ICD-10-CM

## 2021-02-23 DIAGNOSIS — I10 ESSENTIAL (PRIMARY) HYPERTENSION: ICD-10-CM

## 2021-02-23 NOTE — PHYSICAL EXAM
[No Acute Distress] : no acute distress [Normal Sclera/Conjunctiva] : normal sclera/conjunctiva [Normal Outer Ear/Nose] : the outer ears and nose were normal in appearance [No JVD] : no jugular venous distention [No Respiratory Distress] : no respiratory distress  [Normal Rate] : normal rate  [Regular Rhythm] : with a regular rhythm [Normal S1, S2] : normal S1 and S2 [No Edema] : there was no peripheral edema [Soft] : abdomen soft [Non Tender] : non-tender [Non-distended] : non-distended [No Masses] : no abdominal mass palpated [No HSM] : no HSM [Normal Bowel Sounds] : normal bowel sounds [Speech Grossly Normal] : speech grossly normal [Normal Mood] : the mood was normal [de-identified] : slight pain when lying down and trying to get back up in LUQ, no masses palpated

## 2021-02-23 NOTE — END OF VISIT
[] : Resident [FreeTextEntry3] : 65yoF presents for follow up of chronic conditions, LUQ pain x 1 year. Patient endorsing new LUQ pain that occurs with changing position, denies association with GI symptoms, food intake - possibly 2/2 MSK etiology but advised to keep symptom diary for review to determine if 2/2 other etiology. HTN - continue HCTZ. Pre-DM - currently on metformin.

## 2021-02-23 NOTE — HISTORY OF PRESENT ILLNESS
[FreeTextEntry1] : followup chronic conditions [de-identified] : 65F hx HTN, obesity, preDM, chronic right knee pain, uterine prolapse, presents for her annual visit. She complains of sharp pain in left upper quadrant of abdomen around her left lower ribs that started around 1 year ago. She states it feels like a muscle spasm and occurs whenever she lies down and tries to get up or move. Denies trauma to the area or vigorous exercise. Not related to food, no nausea vomiting, has constipation which milk of magnesia helps.\par \par She continues to have nasal congestions and no longer uses flonase due to irritation of nasal mucosa. \par \par She otherwise denies any CP, SOB, cough, headaches, dizziness, lightheadedness, recent injuries, illnesses or any other complaints at this time. ROS otherwise negative.

## 2021-02-23 NOTE — ASSESSMENT
[FreeTextEntry1] : 65F hx HTN, obesity, preDM, chronic right knee pain, uterine prolapse, presents for her annual visit.\par \par Health maintenance- repeat pap 2022, colonoscopy 2026, has appt scheduled for mammogram. Is due for pneumovax 23, however patient refused given past reaction with vaccine causing cellulitis of skin and knee pain. Refused DEXA scan. \par \par RTC in 3 months\par Case discussed with Dr. Fan\par Noe Reyes, [facility]

## 2021-03-12 ENCOUNTER — RESULT REVIEW (OUTPATIENT)
Age: 66
End: 2021-03-12

## 2021-03-12 ENCOUNTER — APPOINTMENT (OUTPATIENT)
Dept: MAMMOGRAPHY | Facility: IMAGING CENTER | Age: 66
End: 2021-03-12
Payer: COMMERCIAL

## 2021-03-12 ENCOUNTER — OUTPATIENT (OUTPATIENT)
Dept: OUTPATIENT SERVICES | Facility: HOSPITAL | Age: 66
LOS: 1 days | End: 2021-03-12
Payer: COMMERCIAL

## 2021-03-12 DIAGNOSIS — Z98.89 OTHER SPECIFIED POSTPROCEDURAL STATES: Chronic | ICD-10-CM

## 2021-03-12 DIAGNOSIS — Z00.00 ENCOUNTER FOR GENERAL ADULT MEDICAL EXAMINATION WITHOUT ABNORMAL FINDINGS: ICD-10-CM

## 2021-03-12 PROCEDURE — 77067 SCR MAMMO BI INCL CAD: CPT

## 2021-03-12 PROCEDURE — 77063 BREAST TOMOSYNTHESIS BI: CPT

## 2021-03-12 PROCEDURE — 77063 BREAST TOMOSYNTHESIS BI: CPT | Mod: 26

## 2021-03-12 PROCEDURE — 77067 SCR MAMMO BI INCL CAD: CPT | Mod: 26

## 2021-03-30 ENCOUNTER — LABORATORY RESULT (OUTPATIENT)
Age: 66
End: 2021-03-30

## 2021-03-31 ENCOUNTER — APPOINTMENT (OUTPATIENT)
Dept: OBGYN | Facility: CLINIC | Age: 66
End: 2021-03-31

## 2021-03-31 ENCOUNTER — APPOINTMENT (OUTPATIENT)
Dept: OBGYN | Facility: CLINIC | Age: 66
End: 2021-03-31
Payer: COMMERCIAL

## 2021-03-31 ENCOUNTER — LABORATORY RESULT (OUTPATIENT)
Age: 66
End: 2021-03-31

## 2021-03-31 ENCOUNTER — OUTPATIENT (OUTPATIENT)
Dept: OUTPATIENT SERVICES | Facility: HOSPITAL | Age: 66
LOS: 1 days | End: 2021-03-31
Payer: SELF-PAY

## 2021-03-31 VITALS — TEMPERATURE: 97.3 F

## 2021-03-31 VITALS — SYSTOLIC BLOOD PRESSURE: 138 MMHG | WEIGHT: 190 LBS | BODY MASS INDEX: 32.61 KG/M2 | DIASTOLIC BLOOD PRESSURE: 80 MMHG

## 2021-03-31 DIAGNOSIS — R39.11 HESITANCY OF MICTURITION: ICD-10-CM

## 2021-03-31 DIAGNOSIS — Z01.419 ENCOUNTER FOR GYNECOLOGICAL EXAMINATION (GENERAL) (ROUTINE) WITHOUT ABNORMAL FINDINGS: ICD-10-CM

## 2021-03-31 DIAGNOSIS — N76.0 ACUTE VAGINITIS: ICD-10-CM

## 2021-03-31 DIAGNOSIS — Z98.89 OTHER SPECIFIED POSTPROCEDURAL STATES: Chronic | ICD-10-CM

## 2021-03-31 DIAGNOSIS — N81.4 UTEROVAGINAL PROLAPSE, UNSPECIFIED: ICD-10-CM

## 2021-03-31 DIAGNOSIS — Z01.419 ENCOUNTER FOR GYNECOLOGICAL EXAMINATION (GENERAL) (ROUTINE) W/OUT ABNORMAL FINDINGS: ICD-10-CM

## 2021-03-31 PROCEDURE — 87086 URINE CULTURE/COLONY COUNT: CPT

## 2021-03-31 PROCEDURE — 99397 PER PM REEVAL EST PAT 65+ YR: CPT

## 2021-03-31 PROCEDURE — 87624 HPV HI-RISK TYP POOLED RSLT: CPT

## 2021-03-31 PROCEDURE — G0439: CPT

## 2021-03-31 NOTE — REVIEW OF SYSTEMS
[Pelvic Pressure] : pelvic pressure [Urinary Incontinence] : urinary incontinence [Urinary Retention] : urinary retention [Hesitancy] : urinary hesitancy [Nl] : Integumentary

## 2021-04-01 LAB — HPV HIGH+LOW RISK DNA PNL CVX: SIGNIFICANT CHANGE UP

## 2021-04-02 LAB
CULTURE RESULTS: SIGNIFICANT CHANGE UP
SPECIMEN SOURCE: SIGNIFICANT CHANGE UP

## 2021-04-02 NOTE — HISTORY OF PRESENT ILLNESS
[___ Year(s) Ago] : [unfilled] year(s) ago [Last Mammogram ___] : Last Mammogram was [unfilled] [Last Colonoscopy ___] : Last colonoscopy [unfilled] [Last Pap ___] : Last cervical pap smear was [unfilled] [Postmenopausal] : is postmenopausal [Post-Menopause, No Sxs] : post-menopausal, currently without symptoms [Sexually Active] : is not sexually active

## 2021-04-02 NOTE — PHYSICAL EXAM
[Awake] : awake [Alert] : alert [Soft] : soft [Oriented x3] : oriented to person, place, and time [Normal] : uterus [Labia Majora] : labia major [Labia Minora] : labia minora [Atrophy] : atrophy [Dry Mucosa] : dry mucosa [No Bleeding] : there was no active vaginal bleeding [Pap Obtained] : a Pap smear was performed [Uterine Adnexae] : were not tender and not enlarged [Acute Distress] : no acute distress [Mass] : no breast mass [Nipple Discharge] : no nipple discharge [Axillary LAD] : no axillary lymphadenopathy [Tender] : non tender [Tenderness] : nontender [FreeTextEntry7] : grade 2 uterine prolapse (dx in 2019)

## 2021-04-03 LAB — CYTOLOGY SPEC DOC CYTO: SIGNIFICANT CHANGE UP

## 2021-05-04 ENCOUNTER — RESULT REVIEW (OUTPATIENT)
Age: 66
End: 2021-05-04

## 2021-05-04 ENCOUNTER — OUTPATIENT (OUTPATIENT)
Dept: OUTPATIENT SERVICES | Facility: HOSPITAL | Age: 66
LOS: 1 days | End: 2021-05-04

## 2021-05-04 ENCOUNTER — APPOINTMENT (OUTPATIENT)
Dept: INTERNAL MEDICINE | Facility: CLINIC | Age: 66
End: 2021-05-04
Payer: COMMERCIAL

## 2021-05-04 VITALS
RESPIRATION RATE: 16 BRPM | HEART RATE: 67 BPM | WEIGHT: 192 LBS | DIASTOLIC BLOOD PRESSURE: 76 MMHG | HEIGHT: 64 IN | BODY MASS INDEX: 32.78 KG/M2 | SYSTOLIC BLOOD PRESSURE: 132 MMHG | OXYGEN SATURATION: 99 %

## 2021-05-04 VITALS — TEMPERATURE: 98.2 F

## 2021-05-04 DIAGNOSIS — Z98.89 OTHER SPECIFIED POSTPROCEDURAL STATES: Chronic | ICD-10-CM

## 2021-05-04 DIAGNOSIS — R73.09 OTHER ABNORMAL GLUCOSE: ICD-10-CM

## 2021-05-04 DIAGNOSIS — Z00.00 ENCOUNTER FOR GENERAL ADULT MEDICAL EXAMINATION W/OUT ABNORMAL FINDINGS: ICD-10-CM

## 2021-05-04 DIAGNOSIS — M79.89 OTHER SPECIFIED SOFT TISSUE DISORDERS: ICD-10-CM

## 2021-05-04 LAB
A1C WITH ESTIMATED AVERAGE GLUCOSE RESULT: 5.7 % — HIGH (ref 4–5.6)
ALBUMIN SERPL ELPH-MCNC: 4.5 G/DL — SIGNIFICANT CHANGE UP (ref 3.3–5)
ALP SERPL-CCNC: 82 U/L — SIGNIFICANT CHANGE UP (ref 40–120)
ALT FLD-CCNC: 13 U/L — SIGNIFICANT CHANGE UP (ref 4–33)
ANION GAP SERPL CALC-SCNC: 13 MMOL/L — SIGNIFICANT CHANGE UP (ref 7–14)
AST SERPL-CCNC: 21 U/L — SIGNIFICANT CHANGE UP (ref 4–32)
BASOPHILS # BLD AUTO: 0.04 K/UL — SIGNIFICANT CHANGE UP (ref 0–0.2)
BASOPHILS NFR BLD AUTO: 0.7 % — SIGNIFICANT CHANGE UP (ref 0–2)
BILIRUB SERPL-MCNC: 0.3 MG/DL — SIGNIFICANT CHANGE UP (ref 0.2–1.2)
BUN SERPL-MCNC: 18 MG/DL — SIGNIFICANT CHANGE UP (ref 7–23)
CALCIUM SERPL-MCNC: 9.8 MG/DL — SIGNIFICANT CHANGE UP (ref 8.4–10.5)
CHLORIDE SERPL-SCNC: 101 MMOL/L — SIGNIFICANT CHANGE UP (ref 98–107)
CO2 SERPL-SCNC: 26 MMOL/L — SIGNIFICANT CHANGE UP (ref 22–31)
CREAT SERPL-MCNC: 0.67 MG/DL — SIGNIFICANT CHANGE UP (ref 0.5–1.3)
EOSINOPHIL # BLD AUTO: 0.14 K/UL — SIGNIFICANT CHANGE UP (ref 0–0.5)
EOSINOPHIL NFR BLD AUTO: 2.4 % — SIGNIFICANT CHANGE UP (ref 0–6)
ESTIMATED AVERAGE GLUCOSE: 117 MG/DL — HIGH (ref 68–114)
GLUCOSE SERPL-MCNC: 91 MG/DL — SIGNIFICANT CHANGE UP (ref 70–99)
HCT VFR BLD CALC: 40.6 % — SIGNIFICANT CHANGE UP (ref 34.5–45)
HGB BLD-MCNC: 12.7 G/DL — SIGNIFICANT CHANGE UP (ref 11.5–15.5)
IANC: 2.56 K/UL — SIGNIFICANT CHANGE UP (ref 1.5–8.5)
IMM GRANULOCYTES NFR BLD AUTO: 0.3 % — SIGNIFICANT CHANGE UP (ref 0–1.5)
LYMPHOCYTES # BLD AUTO: 2.52 K/UL — SIGNIFICANT CHANGE UP (ref 1–3.3)
LYMPHOCYTES # BLD AUTO: 44.1 % — HIGH (ref 13–44)
MCHC RBC-ENTMCNC: 25.8 PG — LOW (ref 27–34)
MCHC RBC-ENTMCNC: 31.3 GM/DL — LOW (ref 32–36)
MCV RBC AUTO: 82.4 FL — SIGNIFICANT CHANGE UP (ref 80–100)
MONOCYTES # BLD AUTO: 0.44 K/UL — SIGNIFICANT CHANGE UP (ref 0–0.9)
MONOCYTES NFR BLD AUTO: 7.7 % — SIGNIFICANT CHANGE UP (ref 2–14)
NEUTROPHILS # BLD AUTO: 2.56 K/UL — SIGNIFICANT CHANGE UP (ref 1.8–7.4)
NEUTROPHILS NFR BLD AUTO: 44.8 % — SIGNIFICANT CHANGE UP (ref 43–77)
NRBC # BLD: 0 /100 WBCS — SIGNIFICANT CHANGE UP
NRBC # FLD: 0 K/UL — SIGNIFICANT CHANGE UP
PLATELET # BLD AUTO: 294 K/UL — SIGNIFICANT CHANGE UP (ref 150–400)
POTASSIUM SERPL-MCNC: 3.3 MMOL/L — LOW (ref 3.5–5.3)
POTASSIUM SERPL-SCNC: 3.3 MMOL/L — LOW (ref 3.5–5.3)
PROT SERPL-MCNC: 7.9 G/DL — SIGNIFICANT CHANGE UP (ref 6–8.3)
RBC # BLD: 4.93 M/UL — SIGNIFICANT CHANGE UP (ref 3.8–5.2)
RBC # FLD: 14.9 % — HIGH (ref 10.3–14.5)
SODIUM SERPL-SCNC: 140 MMOL/L — SIGNIFICANT CHANGE UP (ref 135–145)
WBC # BLD: 5.72 K/UL — SIGNIFICANT CHANGE UP (ref 3.8–10.5)
WBC # FLD AUTO: 5.72 K/UL — SIGNIFICANT CHANGE UP (ref 3.8–10.5)

## 2021-05-04 PROCEDURE — ZZZZZ: CPT

## 2021-05-05 ENCOUNTER — NON-APPOINTMENT (OUTPATIENT)
Age: 66
End: 2021-05-05

## 2021-05-05 LAB
CREAT ?TM UR-MCNC: 83 MG/DL — SIGNIFICANT CHANGE UP
MICROALBUMIN UR-MCNC: <1.2 MG/DL — SIGNIFICANT CHANGE UP
MICROALBUMIN/CREAT UR-RTO: SIGNIFICANT CHANGE UP MG/G (ref 0–30)

## 2021-05-06 NOTE — HISTORY OF PRESENT ILLNESS
[FreeTextEntry1] : Follow up chronic medical problems [de-identified] : 65F with HTN, preDM, uterine prolapse who presents for follow-up of her chronic medical problems.\par \par #Pre-DM - she is on metformin 500mg BID, reports compliance with medication and no adverse effects.  A1c has been well controlled at goal.\par \par #HTN - on HCTZ 25mg daily and amlodipine 10mg daily, reports compliance with medications and no adverse effects.  BP at goal.\par \par #Uterine prolapse - she expressed concern about this issue, and wanted follow-up with uro-gynecology.  She saw Gynecology on 3/31 and a referral is placed in the system; I informed her I will call their office to ensure she gets appropriate follow up.\par \par #HLD - on atorvastatin 20mg, reports compliance with medication, no adverse effects.  Lipids at goal.\par \par #Leg swelling - she has unchanged, bilateral 1+ pitting edema which she says has been present for years and is slightly improved since she initiated HCTZ.  Looking in our system, her last echocardiogram was in 2018 and showed an EF of 65%, minimal MR, mild pHTN without evidence of diastolic dysfunction.\par \par #GERD - she has a history of waking up with "stuff in her throat" which she attributed to allergic rhinits and was taking flonase for, for years, without improvement, until she stopped 2/2 epistaxis.  We discussed this problem and she notices it primarily at night and when lying flat, and sometimes is worse after eating.  The symptoms are concerning for GERD, and I recommended some lifestyle modifications and ranitidine as first line therapeutic option.\par \par #COVID - she is fully vaccinated, received moderna vaccines on Mar 3 and 31st.

## 2021-05-06 NOTE — PLAN
[FreeTextEntry1] : RTC in 6 months with Dr. Noe Reyes, [facility]\par \par Plan discussed with Dr. Светлана Gamino.

## 2021-05-06 NOTE — REVIEW OF SYSTEMS
[Lower Ext Edema] : lower extremity edema [Fever] : no fever [Night Sweats] : no night sweats [Discharge] : no discharge [Nasal Discharge] : no nasal discharge [Postnasal Drip] : no postnasal drip [Chest Pain] : no chest pain [Palpitations] : no palpitations [Abdominal Pain] : no abdominal pain [Nausea] : no nausea [Constipation] : no constipation [Diarrhea] : diarrhea [Dysuria] : no dysuria [Incontinence] : no incontinence [Hematuria] : no hematuria [Joint Pain] : no joint pain [Skin Rash] : no skin rash [Headache] : no headache [Dizziness] : no dizziness

## 2021-05-06 NOTE — PHYSICAL EXAM
[No Acute Distress] : no acute distress [Well Nourished] : well nourished [Well Developed] : well developed [Well-Appearing] : well-appearing [Normal Sclera/Conjunctiva] : normal sclera/conjunctiva [EOMI] : extraocular movements intact [Normal Oropharynx] : the oropharynx was normal [No JVD] : no jugular venous distention [No Lymphadenopathy] : no lymphadenopathy [No Respiratory Distress] : no respiratory distress  [No Accessory Muscle Use] : no accessory muscle use [Clear to Auscultation] : lungs were clear to auscultation bilaterally [Normal Rate] : normal rate  [Regular Rhythm] : with a regular rhythm [Normal S1, S2] : normal S1 and S2 [No Murmur] : no murmur heard [Soft] : abdomen soft [Non Tender] : non-tender [Non-distended] : non-distended [No Spinal Tenderness] : no spinal tenderness [No Rash] : no rash [Coordination Grossly Intact] : coordination grossly intact [No Focal Deficits] : no focal deficits [de-identified] : 1+ edema b/l LE

## 2021-05-10 DIAGNOSIS — N81.4 UTEROVAGINAL PROLAPSE, UNSPECIFIED: ICD-10-CM

## 2021-05-10 DIAGNOSIS — R73.09 OTHER ABNORMAL GLUCOSE: ICD-10-CM

## 2021-05-10 DIAGNOSIS — K21.9 GASTRO-ESOPHAGEAL REFLUX DISEASE WITHOUT ESOPHAGITIS: ICD-10-CM

## 2021-05-10 DIAGNOSIS — I10 ESSENTIAL (PRIMARY) HYPERTENSION: ICD-10-CM

## 2021-05-10 DIAGNOSIS — E78.5 HYPERLIPIDEMIA, UNSPECIFIED: ICD-10-CM

## 2021-05-21 ENCOUNTER — NON-APPOINTMENT (OUTPATIENT)
Age: 66
End: 2021-05-21

## 2021-07-21 ENCOUNTER — NON-APPOINTMENT (OUTPATIENT)
Age: 66
End: 2021-07-21

## 2021-07-23 ENCOUNTER — OUTPATIENT (OUTPATIENT)
Dept: OUTPATIENT SERVICES | Facility: HOSPITAL | Age: 66
LOS: 1 days | End: 2021-07-23
Payer: SELF-PAY

## 2021-07-23 ENCOUNTER — APPOINTMENT (OUTPATIENT)
Dept: OBGYN | Facility: CLINIC | Age: 66
End: 2021-07-23
Payer: COMMERCIAL

## 2021-07-23 VITALS
SYSTOLIC BLOOD PRESSURE: 130 MMHG | TEMPERATURE: 98 F | HEIGHT: 64 IN | BODY MASS INDEX: 32.78 KG/M2 | DIASTOLIC BLOOD PRESSURE: 80 MMHG | WEIGHT: 192 LBS

## 2021-07-23 DIAGNOSIS — N76.0 ACUTE VAGINITIS: ICD-10-CM

## 2021-07-23 DIAGNOSIS — Z98.89 OTHER SPECIFIED POSTPROCEDURAL STATES: Chronic | ICD-10-CM

## 2021-07-23 DIAGNOSIS — R33.9 RETENTION OF URINE, UNSPECIFIED: ICD-10-CM

## 2021-07-23 DIAGNOSIS — N39.46 MIXED INCONTINENCE: ICD-10-CM

## 2021-07-23 LAB
BILIRUB UR QL STRIP: NORMAL
CLARITY UR: CLEAR
COLLECTION METHOD: NORMAL
GLUCOSE UR-MCNC: NORMAL
HCG UR QL: 0.2 EU/DL
HGB UR QL STRIP.AUTO: NORMAL
KETONES UR-MCNC: NORMAL
LEUKOCYTE ESTERASE UR QL STRIP: NORMAL
NITRITE UR QL STRIP: NORMAL
PH UR STRIP: 7
PROT UR STRIP-MCNC: NORMAL
SP GR UR STRIP: 1.02

## 2021-07-23 PROCEDURE — G0463: CPT | Mod: 25

## 2021-07-23 PROCEDURE — 51701 INSERT BLADDER CATHETER: CPT

## 2021-07-23 PROCEDURE — 99215 OFFICE O/P EST HI 40 MIN: CPT | Mod: 25

## 2021-07-23 PROCEDURE — 51701 INSERT BLADDER CATHETER: CPT | Mod: NC

## 2021-07-23 NOTE — REASON FOR VISIT
[Questionnaire Received] : Patient questionnaire received [Intake Form Reviewed] : Patient intake form with past medical history, surgical history, family history and social history reviewed today [Pelvic Organ Prolapse] : pelvic organ prolapse [Initial Visit ___] : an initial visit for [unfilled] [Urinary Urgency] : urinary urgency

## 2021-07-23 NOTE — HISTORY OF PRESENT ILLNESS
[Vaginal Wall Prolapse] : no [Rectal Prolapse] : severe [Uses ___ pads per day] : uses [unfilled] pad(s) per day [Unable To Restrain Bowel Movement] : severe [] : years ago [FreeTextEntry3] : with urination & defecation [de-identified] : 2 [FreeTextEntry5] : with any type of movement [de-identified] : 2 [de-identified] : w [de-identified] : a few drops on her way to the restroom [de-identified] : has difficulty urinating while sitting on toilet, most effective urination when standing in shower [de-identified] : often, then when gets to bathroom can't start stream [FreeTextEntry6] : c/o hesitancy leading to hard stools [de-identified] : every time she defecates [FreeTextEntry1] : \shiv Kiran is a 64yo P3 presenting with complaints of prolapse. She reports for two years she feels a bulge with any type of movement, activity or position change. She states she feels constant pressure that affects her urination and bowel movements requiring splinting. She feels the best way for her to empty bladder is with standing in shower and splinting. Has CRISTIAN and intermittent episodes of UUI. Wears depends daily. \par \par \par PMH: T2DM, HTN, HLD, venous stasis of L leg w/ associated edema\par PSH: knee surgery\par Meds: HCTZ, metformin, amlodipine\par Social: 1 cup of coffee/day

## 2021-07-23 NOTE — DISCUSSION/SUMMARY
[FreeTextEntry1] : \par Computer generated images were used to demonstrate her prolapse as well as explain treatment options. We reviewed management options for her prolapse including: observation, pelvic floor exercises with and without PT, pessary, and surgical management. We reviewed various surgical management options including vaginal, laparoscopic/robotic, abdominal procedures. We reviewed procedures involving hysterectomy as well as uterine sparing procedures. We also reviewed both mesh and non-mesh options. Written [facility] information on prolapse treatment options was also provided to her to review. \par - She desires to proceed with surgical intervention. I recommend preoperative workup, including Pelvic US and urodynamics. Patient understood and agrees with plan. Patient instructed to obtain US prior to her follow up appointment. \par - [facility] handout provided on POP and Urodynamics

## 2021-07-23 NOTE — PHYSICAL EXAM
[Chaperone Present] : A chaperone was present in the examining room during all aspects of the physical examination [Vulvar Atrophy] : vulvar atrophy [Labia Majora] : were normal [Labia Minora] : were normal [Normal Appearance] : general appearance was normal [Atrophy] : atrophy [Normal] : no abnormalities [Uterine Prolapse] : uterine prolapse [No Bleeding] : there was no active vaginal bleeding [Bp ____] : Bp [unfilled] [FreeTextEntry1] : \par General: Well, appearing. Alert and orientated. No acute distress\par HEENT: Normocephalic, atraumatic and extraocular muscles appear to be intact \par Neck: Full range of motion, no obvious lymphadenopathy, deformities, or masses noted \par Respiratory: Speaking in full sentences comfortably, normal work of breathing and no cough during visit\par Musculoskeletal: active full range of motion in extremities \par Extremities: No upper extremity edema noted\par Skin: no obvious rash or skin lesions\par Neuro: Orientated X 3, speech is fluent, normal rate\par Psych: Normal mood and affect  [Tenderness] : ~T no ~M abdominal tenderness observed [Distended] : not distended [Scar] : no scars [Aa ____] : Aa [unfilled] [Ba ____] : Ba [unfilled] [C ____] : C [unfilled] [GH ____] : GH [unfilled] [PB ____] : PB [unfilled] [TVL ____] : TVL  [unfilled] [Ap ____] : Ap [unfilled] [D ____] : D [unfilled] [Exam Deferred] : was deferred

## 2021-07-23 NOTE — END OF VISIT
[FreeTextEntry3] : I have fully participated in the care of this patient. I have seen patient and reviewed all pertinent clinical information, including history, physical exam, plan and the note and I agree.  [Time Spent: ___ minutes] : I have spent [unfilled] minutes of time on the encounter.

## 2021-07-27 DIAGNOSIS — N81.2 INCOMPLETE UTEROVAGINAL PROLAPSE: ICD-10-CM

## 2021-07-27 DIAGNOSIS — R33.9 RETENTION OF URINE, UNSPECIFIED: ICD-10-CM

## 2021-07-27 DIAGNOSIS — N39.46 MIXED INCONTINENCE: ICD-10-CM

## 2021-07-28 ENCOUNTER — OUTPATIENT (OUTPATIENT)
Dept: OUTPATIENT SERVICES | Facility: HOSPITAL | Age: 66
LOS: 1 days | End: 2021-07-28
Payer: SELF-PAY

## 2021-07-28 ENCOUNTER — APPOINTMENT (OUTPATIENT)
Dept: ULTRASOUND IMAGING | Facility: CLINIC | Age: 66
End: 2021-07-28
Payer: COMMERCIAL

## 2021-07-28 ENCOUNTER — RESULT REVIEW (OUTPATIENT)
Age: 66
End: 2021-07-28

## 2021-07-28 DIAGNOSIS — Z98.89 OTHER SPECIFIED POSTPROCEDURAL STATES: Chronic | ICD-10-CM

## 2021-07-28 DIAGNOSIS — R33.9 RETENTION OF URINE, UNSPECIFIED: ICD-10-CM

## 2021-07-28 DIAGNOSIS — N39.46 MIXED INCONTINENCE: ICD-10-CM

## 2021-07-28 DIAGNOSIS — N81.2 INCOMPLETE UTEROVAGINAL PROLAPSE: ICD-10-CM

## 2021-07-28 PROCEDURE — 76856 US EXAM PELVIC COMPLETE: CPT

## 2021-07-28 PROCEDURE — 76830 TRANSVAGINAL US NON-OB: CPT | Mod: 26

## 2021-07-28 PROCEDURE — 76856 US EXAM PELVIC COMPLETE: CPT | Mod: 26

## 2021-07-28 PROCEDURE — 76830 TRANSVAGINAL US NON-OB: CPT

## 2021-08-02 LAB
APPEARANCE: CLEAR
BACTERIA UR CULT: NORMAL
BACTERIA: NEGATIVE
BILIRUBIN URINE: NEGATIVE
BLOOD URINE: NEGATIVE
COLOR: NORMAL
GLUCOSE QUALITATIVE U: NEGATIVE
HYALINE CASTS: 0 /LPF
KETONES URINE: NEGATIVE
LEUKOCYTE ESTERASE URINE: NEGATIVE
MICROSCOPIC-UA: NORMAL
NITRITE URINE: NEGATIVE
PH URINE: 7
PROTEIN URINE: NORMAL
RED BLOOD CELLS URINE: 2 /HPF
SPECIFIC GRAVITY URINE: 1.02
SQUAMOUS EPITHELIAL CELLS: 0 /HPF
UROBILINOGEN URINE: NORMAL
WHITE BLOOD CELLS URINE: 0 /HPF

## 2021-08-13 ENCOUNTER — APPOINTMENT (OUTPATIENT)
Dept: UROGYNECOLOGY | Facility: CLINIC | Age: 66
End: 2021-08-13
Payer: SELF-PAY

## 2021-08-13 ENCOUNTER — OUTPATIENT (OUTPATIENT)
Dept: OUTPATIENT SERVICES | Facility: HOSPITAL | Age: 66
LOS: 1 days | End: 2021-08-13
Payer: SELF-PAY

## 2021-08-13 VITALS — DIASTOLIC BLOOD PRESSURE: 80 MMHG | SYSTOLIC BLOOD PRESSURE: 130 MMHG | TEMPERATURE: 96.3 F

## 2021-08-13 DIAGNOSIS — N36.8 OTHER SPECIFIED DISORDERS OF URETHRA: ICD-10-CM

## 2021-08-13 DIAGNOSIS — Z98.89 OTHER SPECIFIED POSTPROCEDURAL STATES: Chronic | ICD-10-CM

## 2021-08-13 PROCEDURE — 51797 INTRAABDOMINAL PRESSURE TEST: CPT | Mod: 26,NC

## 2021-08-13 PROCEDURE — 51797 INTRAABDOMINAL PRESSURE TEST: CPT

## 2021-08-13 PROCEDURE — 51729 CYSTOMETROGRAM W/VP&UP: CPT | Mod: 26,NC

## 2021-08-13 PROCEDURE — 51784 ANAL/URINARY MUSCLE STUDY: CPT | Mod: 26,NC

## 2021-08-13 PROCEDURE — 51729 CYSTOMETROGRAM W/VP&UP: CPT

## 2021-08-13 PROCEDURE — 51784 ANAL/URINARY MUSCLE STUDY: CPT

## 2021-08-16 DIAGNOSIS — R39.15 URGENCY OF URINATION: ICD-10-CM

## 2021-08-17 ENCOUNTER — OUTPATIENT (OUTPATIENT)
Dept: OUTPATIENT SERVICES | Facility: HOSPITAL | Age: 66
LOS: 1 days | End: 2021-08-17
Payer: SELF-PAY

## 2021-08-17 ENCOUNTER — APPOINTMENT (OUTPATIENT)
Dept: UROGYNECOLOGY | Facility: CLINIC | Age: 66
End: 2021-08-17
Payer: SELF-PAY

## 2021-08-17 VITALS — DIASTOLIC BLOOD PRESSURE: 78 MMHG | SYSTOLIC BLOOD PRESSURE: 138 MMHG | TEMPERATURE: 96.2 F

## 2021-08-17 DIAGNOSIS — N36.8 OTHER SPECIFIED DISORDERS OF URETHRA: ICD-10-CM

## 2021-08-17 DIAGNOSIS — R39.15 URGENCY OF URINATION: ICD-10-CM

## 2021-08-17 DIAGNOSIS — Z98.89 OTHER SPECIFIED POSTPROCEDURAL STATES: Chronic | ICD-10-CM

## 2021-08-17 PROCEDURE — G0463: CPT

## 2021-08-17 PROCEDURE — 99214 OFFICE O/P EST MOD 30 MIN: CPT

## 2021-08-17 NOTE — HISTORY OF PRESENT ILLNESS
[Vaginal Wall Prolapse] : no [Rectal Prolapse] : severe [Unable To Restrain Bowel Movement] : severe [] : yes [Uses ___ pads per day] : uses [unfilled] pad(s) per day [FreeTextEntry3] : with urination & defecation [de-identified] : 2 [FreeTextEntry5] : with any type of movement [de-identified] : 2 [de-identified] : a few drops on her way to the restroom [de-identified] : has difficulty urinating while sitting on toilet, most effective urination when standing in shower [de-identified] : often, then when gets to bathroom can't start stream [FreeTextEntry6] : c/o hesitancy leading to hard stools [de-identified] : every time she defecates [FreeTextEntry1] : \par Qiana is a 64 yo with stage III pelvic organ prolapse and urinary incontinence. She presents for discussion on her recent urodynamics which showed no stress incontinence or detrusor overactivity with prolapse reduced. Discussed with her that although the test does not show detrusor overactivity, it does not mean that she does not have overactive bladder. She desires to proceed with surgical intervention. She denies postmenopausal bleeding.\par \par Urodynamics: (-) CST. No DO. long-term 238mL (severe urgency). \par U/S 08/09/2021: uterus: 8.8 cm x 3.9 cm x 4.9 cm. Anterior fibroid measuring 1.4 cm x 1.4 cm. EMS: 5mm. R ovary: 1.9 cm x 1.5 cm. L ovary: 1.7 cm x 1.0 cm. \par Pap smear 04/2021: negative cotesting

## 2021-08-17 NOTE — REASON FOR VISIT
[Follow-Up Visit_____] : a follow-up visit for [unfilled] [Urinary Incontinence] : urinary incontinence [Pelvic Organ Prolapse] : pelvic organ prolapse

## 2021-08-17 NOTE — DISCUSSION/SUMMARY
[FreeTextEntry1] : Qiana is a 65 yo with stage III pelvic organ prolapse. We reviewed management options for her prolapse including: observation, pelvic floor exercises with and without PT, pessary, and surgical management. In regards to surgical management, we talked about vaginal closure, vaginal approach, and abdominal approach to surgery. She desires abdominal approach to surgery and would like to proceed with robotic-assisted supracervical hysterectomy with sacrocolpopexy. She will return for preoperative visit. Discussed with patient that she will need medical clearance prior to proceeding with surgery. \par \par All questions answered. Pt instructed to call office if any further questions/concerns arise. \par \par

## 2021-08-19 DIAGNOSIS — N81.4 UTEROVAGINAL PROLAPSE, UNSPECIFIED: ICD-10-CM

## 2021-08-19 DIAGNOSIS — N81.2 INCOMPLETE UTEROVAGINAL PROLAPSE: ICD-10-CM

## 2021-08-19 DIAGNOSIS — R39.15 URGENCY OF URINATION: ICD-10-CM

## 2021-11-03 ENCOUNTER — APPOINTMENT (OUTPATIENT)
Dept: UROGYNECOLOGY | Facility: CLINIC | Age: 66
End: 2021-11-03

## 2021-11-03 VITALS — TEMPERATURE: 96.8 F | DIASTOLIC BLOOD PRESSURE: 80 MMHG | SYSTOLIC BLOOD PRESSURE: 140 MMHG

## 2021-11-03 DIAGNOSIS — N81.11 CYSTOCELE, MIDLINE: ICD-10-CM

## 2021-11-03 DIAGNOSIS — N81.2 INCOMPLETE UTEROVAGINAL PROLAPSE: ICD-10-CM

## 2021-11-04 NOTE — DISCUSSION/SUMMARY
[FreeTextEntry1] : Qiana is a 67 yo with stage III pelvic organ prolapse. We reviewed both nonsurgical and surgical options and she continues to desire surgical management. She has been counseled regarding options for reconstructive surgery. This includes both abdominal, laparoscopic, robotic,and vaginal options. She has elected for a minimally invasive abdominal approach to surgery. The options for repairs with and without the use of mesh and biological materials were reviewed. Based on our discussion she would like to go with a sacrocolpopexy.  She understands we will also perform a supracervical hysterectomy and her cervix will remain in situ.  She has no history of abnormal paps and a recent normal pap.  We discussed success rates, failure rates, and possible risks. The risks discussed include, but are not limited to: changes to the vaginal anatomy, vaginal pain, bleeding, pelvic pain, dyspareunia, need for revision, vaginal discharge, urinary retention, development or worsening of stress urinary incontinence or urge incontinence, infection, failure of the procedure, neuropathy. We also extensively reviewed the risk of injury to the bowel, rectum, bladder, ureters, urethra, blood vessels, and nerves. We discussed the risk of fistula formation and possible conversion to laparotomy. We also discussed possible change in bowel habits, with improvement or worsening of constipation. We discussed the possibility of removing the ovaries/fallopian tubes.  She prefers removal of the ovaries and fallopian tubes.. All risks were explained in layman's terms. \par \par Based on our discussion she would like to proceed with a robotic assisted supracervical hysterectomy, bilateral salpingo-oophorectomy, sacrocolpopexy, cystoscopy, and pelvic exam under anesthesia. We reviewed the preoperative and postoperative instructions as well as hospital stay. Consent was signed in the office, all questions answered.

## 2021-11-04 NOTE — PHYSICAL EXAM
[Chaperone Present] : A chaperone was present in the examining room during all aspects of the physical examination [Vulvar Atrophy] : vulvar atrophy [Labia Majora] : were normal [Labia Minora] : were normal [Normal Appearance] : general appearance was normal [Atrophy] : atrophy [Uterine Prolapse] : uterine prolapse [No Bleeding] : there was no active vaginal bleeding [Aa ____] : Aa [unfilled] [Ba ____] : Ba [unfilled] [C ____] : C [unfilled] [GH ____] : GH [unfilled] [PB ____] : PB [unfilled] [TVL ____] : TVL  [unfilled] [Ap ____] : Ap [unfilled] [Bp ____] : Bp [unfilled] [D ____] : D [unfilled] [Normal] : no abnormalities [Exam Deferred] : was deferred [FreeTextEntry1] : \par General: Well, appearing. Alert and orientated. No acute distress\par HEENT: Normocephalic, atraumatic and extraocular muscles appear to be intact \par Neck: Full range of motion, no obvious lymphadenopathy, deformities, or masses noted \par Respiratory: Speaking in full sentences comfortably, normal work of breathing and no cough during visit\par Musculoskeletal: active full range of motion in extremities \par Extremities: No upper extremity edema noted\par Skin: no obvious rash or skin lesions\par Neuro: Orientated X 3, speech is fluent, normal rate\par Psych: Normal mood and affect  [Tenderness] : ~T no ~M abdominal tenderness observed [Distended] : not distended [Scar] : no scars

## 2021-11-04 NOTE — HISTORY OF PRESENT ILLNESS
[Vaginal Wall Prolapse] : no [Rectal Prolapse] : severe [Unable To Restrain Bowel Movement] : mild [Pain During Urination (Dysuria)] : mild [Urinary Tract Infection] : mild [Uses ___ pads per day] : uses [unfilled] pad(s) per day [Constipation Obstructed Defecation] : moderate [] : years ago [FreeTextEntry3] : with urination & defecation [de-identified] : 2 [FreeTextEntry5] : with any type of movement [de-identified] : 2 [de-identified] :  while sitting on toilet has some difficulty, not when standing [FreeTextEntry1] : \par \par Qiana is a 64 yo with stage III pelvic organ prolapse and urinary incontinence. She presents for follow up.  Patient has had no change in symptoms since her initial visit and continues to desire surgery.  Her preoperative work up is as follows:\par \par Urodynamics: Severe urgency with residential at 238mL, no DO, negative CST (-). PVR 0cc.\par \par TVUS (8/09/21): uterus: 8.8 cm x 3.9 cm x 4.9 cm. Anterior fibroid measuring 1.4 cm x 1.4 cm. EMS: 5mm. R ovary: 1.9 cm x 1.5 cm. L ovary: 1.7 cm x 1.0 cm. \par \par Pap smear 03/31/21: cytology and HPV testing\par \par PMH: T2DM, HTN, HLD, venous stasis of L leg w/ associated edema\par PSH: knee surgery\par Meds: HCTZ, metformin, amlodipine\par Social: 1 cup of coffee/day

## 2021-11-05 LAB — BACTERIA UR CULT: NORMAL

## 2021-11-10 ENCOUNTER — OUTPATIENT (OUTPATIENT)
Dept: OUTPATIENT SERVICES | Facility: HOSPITAL | Age: 66
LOS: 1 days | End: 2021-11-10
Payer: SELF-PAY

## 2021-11-10 VITALS
RESPIRATION RATE: 16 BRPM | HEART RATE: 70 BPM | SYSTOLIC BLOOD PRESSURE: 125 MMHG | OXYGEN SATURATION: 97 % | WEIGHT: 192.02 LBS | HEIGHT: 62 IN | TEMPERATURE: 98 F | DIASTOLIC BLOOD PRESSURE: 84 MMHG

## 2021-11-10 DIAGNOSIS — I10 ESSENTIAL (PRIMARY) HYPERTENSION: ICD-10-CM

## 2021-11-10 DIAGNOSIS — E11.9 TYPE 2 DIABETES MELLITUS WITHOUT COMPLICATIONS: ICD-10-CM

## 2021-11-10 DIAGNOSIS — N81.2 INCOMPLETE UTEROVAGINAL PROLAPSE: ICD-10-CM

## 2021-11-10 DIAGNOSIS — Z98.890 OTHER SPECIFIED POSTPROCEDURAL STATES: Chronic | ICD-10-CM

## 2021-11-10 DIAGNOSIS — Z98.89 OTHER SPECIFIED POSTPROCEDURAL STATES: Chronic | ICD-10-CM

## 2021-11-10 DIAGNOSIS — Z01.818 ENCOUNTER FOR OTHER PREPROCEDURAL EXAMINATION: ICD-10-CM

## 2021-11-10 DIAGNOSIS — Z29.9 ENCOUNTER FOR PROPHYLACTIC MEASURES, UNSPECIFIED: ICD-10-CM

## 2021-11-10 LAB
A1C WITH ESTIMATED AVERAGE GLUCOSE RESULT: 5.8 % — HIGH (ref 4–5.6)
ANION GAP SERPL CALC-SCNC: 15 MMOL/L — SIGNIFICANT CHANGE UP (ref 5–17)
BLD GP AB SCN SERPL QL: NEGATIVE — SIGNIFICANT CHANGE UP
BUN SERPL-MCNC: 15 MG/DL — SIGNIFICANT CHANGE UP (ref 7–23)
CALCIUM SERPL-MCNC: 10.1 MG/DL — SIGNIFICANT CHANGE UP (ref 8.4–10.5)
CHLORIDE SERPL-SCNC: 102 MMOL/L — SIGNIFICANT CHANGE UP (ref 96–108)
CO2 SERPL-SCNC: 26 MMOL/L — SIGNIFICANT CHANGE UP (ref 22–31)
CREAT SERPL-MCNC: 0.65 MG/DL — SIGNIFICANT CHANGE UP (ref 0.5–1.3)
ESTIMATED AVERAGE GLUCOSE: 120 MG/DL — HIGH (ref 68–114)
GLUCOSE SERPL-MCNC: 89 MG/DL — SIGNIFICANT CHANGE UP (ref 70–99)
HCT VFR BLD CALC: 40.5 % — SIGNIFICANT CHANGE UP (ref 34.5–45)
HGB BLD-MCNC: 12.9 G/DL — SIGNIFICANT CHANGE UP (ref 11.5–15.5)
MCHC RBC-ENTMCNC: 25.9 PG — LOW (ref 27–34)
MCHC RBC-ENTMCNC: 31.9 GM/DL — LOW (ref 32–36)
MCV RBC AUTO: 81.2 FL — SIGNIFICANT CHANGE UP (ref 80–100)
NRBC # BLD: 0 /100 WBCS — SIGNIFICANT CHANGE UP (ref 0–0)
PLATELET # BLD AUTO: 270 K/UL — SIGNIFICANT CHANGE UP (ref 150–400)
POTASSIUM SERPL-MCNC: 3.3 MMOL/L — LOW (ref 3.5–5.3)
POTASSIUM SERPL-SCNC: 3.3 MMOL/L — LOW (ref 3.5–5.3)
RBC # BLD: 4.99 M/UL — SIGNIFICANT CHANGE UP (ref 3.8–5.2)
RBC # FLD: 14.6 % — HIGH (ref 10.3–14.5)
RH IG SCN BLD-IMP: POSITIVE — SIGNIFICANT CHANGE UP
SODIUM SERPL-SCNC: 143 MMOL/L — SIGNIFICANT CHANGE UP (ref 135–145)
WBC # BLD: 5.04 K/UL — SIGNIFICANT CHANGE UP (ref 3.8–10.5)
WBC # FLD AUTO: 5.04 K/UL — SIGNIFICANT CHANGE UP (ref 3.8–10.5)

## 2021-11-10 PROCEDURE — 86850 RBC ANTIBODY SCREEN: CPT

## 2021-11-10 PROCEDURE — 85027 COMPLETE CBC AUTOMATED: CPT

## 2021-11-10 PROCEDURE — 86901 BLOOD TYPING SEROLOGIC RH(D): CPT

## 2021-11-10 PROCEDURE — 80048 BASIC METABOLIC PNL TOTAL CA: CPT

## 2021-11-10 PROCEDURE — 93010 ELECTROCARDIOGRAM REPORT: CPT

## 2021-11-10 PROCEDURE — G0463: CPT

## 2021-11-10 PROCEDURE — 93005 ELECTROCARDIOGRAM TRACING: CPT

## 2021-11-10 PROCEDURE — 86900 BLOOD TYPING SEROLOGIC ABO: CPT

## 2021-11-10 PROCEDURE — 83036 HEMOGLOBIN GLYCOSYLATED A1C: CPT

## 2021-11-10 RX ORDER — CEFOTETAN DISODIUM 1 G
2 VIAL (EA) INJECTION ONCE
Refills: 0 | Status: DISCONTINUED | OUTPATIENT
Start: 2021-11-24 | End: 2021-11-25

## 2021-11-10 NOTE — H&P PST ADULT - NSICDXPASTSURGICALHX_GEN_ALL_CORE_FT
PAST SURGICAL HISTORY:  S/P carpal tunnel release right wrist    S/P knee surgery left arthoscopic surgery

## 2021-11-10 NOTE — H&P PST ADULT - WEIGHT IN KG
Elevated LFTs    Hepatitis C virus infection, unspecified chronicity  (was tx with Harvoni)  HTN (hypertension) 87.1

## 2021-11-10 NOTE — H&P PST ADULT - NSICDXPASTMEDICALHX_GEN_ALL_CORE_FT
PAST MEDICAL HISTORY:  Bacterial meningitis had spinal tap 1996    Carpal tunnel syndrome on right     HTN (hypertension)     Obesity     Prolapse of female pelvic organs     Tear of meniscus of knee left knee     PAST MEDICAL HISTORY:  Bacterial meningitis had spinal tap 1996    Carpal tunnel syndrome on right     DM2 (diabetes mellitus, type 2)     HLD (hyperlipidemia)     HTN (hypertension)     Obesity     Prolapse of female pelvic organs     Tear of meniscus of knee left knee

## 2021-11-10 NOTE — H&P PST ADULT - HEALTH CARE MAINTENANCE
Received Moderna 2nd dose 3/23/21 Received Moderna 2nd dose 3/23/21  Has not received flu vaccine 2021-22 season

## 2021-11-10 NOTE — H&P PST ADULT - HISTORY OF PRESENT ILLNESS
65 yo female, PMH HTN, HLD, DM2,  - all vaginal deliveries, menopause in her 50's, reports uterus prolapse for about 2 years, which is becoming worse, unable to empty bladder completely, not candidate for pessary. Pt. presents to [facility] for scheduled Laparoscopic Robotic Supracervical Hysterectomy, Laparoscopic Robotic Sacral Colpopexy, Cystoscopy, ERP on 21. Pt. denies COVID-19 infection in , denies close contact with anyone that has been ill, no fever, cough, dyspnea in past two weeks.    Pt. is scheduled for COVID-19 on  at [facility]    Has M/E on 21 - Jaymie 67 yo female, PMH HTN, HLD, DM2,  - all vaginal deliveries, menopause in her 50's, reports uterus prolapse for about 2 years, which is becoming worse, unable to empty bladder completely - has to use Depends, not candidate for pessary. Pt. presents to PST for scheduled Laparoscopic Robotic Supracervical Hysterectomy, Laparoscopic Robotic Sacral Colpopexy, Cystoscopy, ERP on 21. Pt. denies COVID-19 infection in , denies close contact with anyone that has been ill, no fever, cough, dyspnea in past two weeks.    Pt. is scheduled for COVID-19 on  at [facility] M/E on 21 - [employer]

## 2021-11-10 NOTE — H&P PST ADULT - NSANTHOSAYNRD_GEN_A_CORE
No. MARGARITA screening performed.  STOP BANG Legend: 0-2 = LOW Risk; 3-4 = INTERMEDIATE Risk; 5-8 = HIGH Risk

## 2021-11-10 NOTE — H&P PST ADULT - ASSESSMENT
EDAI VTE 2.0 SCORE [CLOT updated 2019]    AGE RELATED RISK FACTORS                                                       MOBILITY RELATED FACTORS  [ ] Age 41-60 years                                            (1 Point)                    [ ] Bed rest                                                        (1 Point)  [x ] Age: 61-74 years                                           (2 Points)                  [ ] Plaster cast                                                   (2 Points)  [ ] Age= 75 years                                              (3 Points)                    [ ] Bed bound for more than 72 hours                 (2 Points)    DISEASE RELATED RISK FACTORS                                               GENDER SPECIFIC FACTORS  [x ] Edema in the lower extremities                       (1 Point)              [ ] Pregnancy                                                     (1 Point)  [ ] Varicose veins                                               (1 Point)                     [ ] Post-partum < 6 weeks                                   (1 Point)             [x ] BMI > 25 Kg/m2                                            (1 Point)                     [ ] Hormonal therapy  or oral contraception          (1 Point)                 [ ] Sepsis (in the previous month)                        (1 Point)               [ ] History of pregnancy complications                 (1 point)  [ ] Pneumonia or serious lung disease                                               [ ] Unexplained or recurrent                     (1 Point)           (in the previous month)                               (1 Point)  [ ] Abnormal pulmonary function test                     (1 Point)                 SURGERY RELATED RISK FACTORS  [ ] Acute myocardial infarction                              (1 Point)               [ ]  Section                                             (1 Point)  [ ] Congestive heart failure (in the previous month)  (1 Point)      [ ] Minor surgery                                                  (1 Point)   [ ] Inflammatory bowel disease                             (1 Point)               [ ] Arthroscopic surgery                                        (2 Points)  [ ] Central venous access                                      (2 Points)                [ x] General surgery lasting more than 45 minutes (2 points)  [ ] Malignancy- Present or previous                   (2 Points)                [ ] Elective arthroplasty                                         (5 points)    [ ] Stroke (in the previous month)                          (5 Points)                                                                                                                                                           HEMATOLOGY RELATED FACTORS                                                 TRAUMA RELATED RISK FACTORS  [ ] Prior episodes of VTE                                     (3 Points)                [ ] Fracture of the hip, pelvis, or leg                       (5 Points)  [ ] Positive family history for VTE                         (3 Points)             [ ] Acute spinal cord injury (in the previous month)  (5 Points)  [ ] Prothrombin 97496 A                                     (3 Points)               [ ] Paralysis  (less than 1 month)                             (5 Points)  [ ] Factor V Leiden                                             (3 Points)                  [ ] Multiple Trauma within 1 month                        (5 Points)  [ ] Lupus anticoagulants                                     (3 Points)                                                           [ ] Anticardiolipin antibodies                               (3 Points)                                                       [ ] High homocysteine in the blood                      (3 Points)                                             [ ] Other congenital or acquired thrombophilia      (3 Points)                                                [ ] Heparin induced thrombocytopenia                  (3 Points)                                     Total Score [        6  ]

## 2021-11-10 NOTE — H&P PST ADULT - ACTIVITY
Can walk 5 blocks, 2 flights of stairs - does not drive - walks everywhere Can walk 3-4 blocks, 2 flights of stairs - does not drive - walks everywhere

## 2021-11-10 NOTE — H&P PST ADULT - BIRTH SEX
Patient needs a refill of Alprazolam-it was denied on 1/28/20    [address] [place] [address] Female

## 2021-11-12 ENCOUNTER — APPOINTMENT (OUTPATIENT)
Dept: INTERNAL MEDICINE | Facility: CLINIC | Age: 66
End: 2021-11-12
Payer: COMMERCIAL

## 2021-11-12 ENCOUNTER — OUTPATIENT (OUTPATIENT)
Dept: OUTPATIENT SERVICES | Facility: HOSPITAL | Age: 66
LOS: 1 days | End: 2021-11-12

## 2021-11-12 VITALS — TEMPERATURE: 97.8 F

## 2021-11-12 VITALS
BODY MASS INDEX: 33.29 KG/M2 | HEART RATE: 70 BPM | OXYGEN SATURATION: 98 % | DIASTOLIC BLOOD PRESSURE: 82 MMHG | SYSTOLIC BLOOD PRESSURE: 140 MMHG | WEIGHT: 195 LBS | HEIGHT: 64 IN

## 2021-11-12 DIAGNOSIS — N81.4 UTEROVAGINAL PROLAPSE, UNSPECIFIED: ICD-10-CM

## 2021-11-12 DIAGNOSIS — E87.6 HYPOKALEMIA: ICD-10-CM

## 2021-11-12 DIAGNOSIS — Z98.890 OTHER SPECIFIED POSTPROCEDURAL STATES: Chronic | ICD-10-CM

## 2021-11-12 DIAGNOSIS — R73.03 PREDIABETES.: ICD-10-CM

## 2021-11-12 DIAGNOSIS — E78.5 HYPERLIPIDEMIA, UNSPECIFIED: ICD-10-CM

## 2021-11-12 DIAGNOSIS — Z23 ENCOUNTER FOR IMMUNIZATION: ICD-10-CM

## 2021-11-12 DIAGNOSIS — Z98.89 OTHER SPECIFIED POSTPROCEDURAL STATES: Chronic | ICD-10-CM

## 2021-11-12 DIAGNOSIS — I10 ESSENTIAL (PRIMARY) HYPERTENSION: ICD-10-CM

## 2021-11-12 PROCEDURE — ZZZZZ: CPT

## 2021-11-13 PROBLEM — E78.5 HYPERLIPIDEMIA, UNSPECIFIED: Chronic | Status: ACTIVE | Noted: 2021-11-10

## 2021-11-13 PROBLEM — E11.9 TYPE 2 DIABETES MELLITUS WITHOUT COMPLICATIONS: Chronic | Status: ACTIVE | Noted: 2021-11-10

## 2021-11-13 PROBLEM — N81.9 FEMALE GENITAL PROLAPSE, UNSPECIFIED: Chronic | Status: ACTIVE | Noted: 2021-11-10

## 2021-11-14 NOTE — END OF VISIT
[] : Resident [FreeTextEntry3] : Discussed medication management prior to surgery, agree with plan per resident note.

## 2021-11-14 NOTE — PLAN
[FreeTextEntry1] : HCM\par -flu vax today\par -dexa ordered\par -pap due in 2022\par -colonoscopy due in 2026\par -mammogram due in 2022\par \par Corey Vallecillo MD PGY-2\par Case Discussed with Dr. Pena

## 2021-11-14 NOTE — PHYSICAL EXAM
[Normal] : normal gait, coordination grossly intact, no focal deficits and deep tendon reflexes were 2+ and symmetric [de-identified] : Mallampati Class 1

## 2021-11-14 NOTE — HISTORY OF PRESENT ILLNESS
[No Pertinent Cardiac History] : no history of aortic stenosis, atrial fibrillation, coronary artery disease, recent myocardial infarction, or implantable device/pacemaker [No Pertinent Pulmonary History] : no history of asthma, COPD, sleep apnea, or smoking [No Adverse Anesthesia Reaction] : no adverse anesthesia reaction in self or family member [(Patient denies any chest pain, claudication, dyspnea on exertion, orthopnea, palpitations or syncope)] : Patient denies any chest pain, claudication, dyspnea on exertion, orthopnea, palpitations or syncope [Excellent (>10 METs)] : Excellent (>10 METs) [Asthma] : no asthma [COPD] : no COPD [Sleep Apnea] : no sleep apnea [Smoker] : not a smoker [Family Member] : no family member with adverse anesthesia reaction/sudden death [Self] : no previous adverse anesthesia reaction [Chronic Anticoagulation] : no chronic anticoagulation [Chronic Kidney Disease] : no chronic kidney disease [Diabetes] : no diabetes [FreeTextEntry1] : minimally invasive hysterectomy and sacrocolpopexy for uterine prolapse [FreeTextEntry2] : 11/24/21 [FreeTextEntry4] : 65F with HTN, preDM, uterine prolapse who presents for follow-up of her chronic medical problems and medical clearance exam for upcoming pelvic surgery. She has had no interim hospital, ED or UC visits and reports feeling well.\par \par #Pre-DM - she is on metformin 500mg BID, reports compliance with medication and no adverse effects. A1c has been well controlled at goal. Recent presurg testing showed A1c of 5.8 on 11/10/21\par \par #HTN - on HCTZ 25mg daily and amlodipine 10mg daily, reports compliance with medications and no adverse effects. BP at goal.\par \par #Uterine prolapse - surgical planning as above. \par \par #HLD - on atorvastatin 20mg, reports compliance with medication, no adverse effects. Lipids at goal at last visit. \par \par #Leg swelling - she has unchanged, bilateral 1+ pitting edema which she says has been present for years and is slightly improved since she initiated HCTZ. Looking in our system, her last echocardiogram was in 2018 and showed an EF of 65%, minimal MR, mild pHTN without evidence of diastolic dysfunction. \par \par #GERD - she denies any heartburn or postprandial chest pain symptoms. has a history of waking up with "stuff in her throat" which she attributed to allergic rhinits and was taking flonase for, for years, without improvement, until she stopped 2/2 epistaxis. At last visit these symptoms were attributed to GERD, and she was recommended to start lifestyle modifications and ranitidine. She has not started ranitidine and was encouraged to consider beginning it. \par \par #COVID - she is fully vaccinated, received moderna vaccines on Mar 3 and 31st of this year. She is due for her booster and will be getting one within the next week as she is opting for flu vax today.  [FreeTextEntry7] : Presurgical ECG 11/10/21: Sinus Derick at 59 bpm, normal axis, normal intervals, no ST elevations, depressions or TWIs indicative of ischemia.

## 2021-11-14 NOTE — ASSESSMENT
[Patient Optimized for Surgery] : Patient optimized for surgery [No Further Testing Recommended] : no further testing recommended [High Risk Surgery - Intraperitoneal, Intrathoracic or Supringuinal Vascular Procedures] : High Risk Surgery - Intraperitoneal, Intrathoracic or Supringuinal Vascular Procedures - Yes (1) [Ischemic Heart Disease] : Ischemic Heart Disease - No (0) [Congestive Heart Failure] : Congestive Heart Failure - No (0) [Prior Cerebrovascular Accident or TIA] : Prior Cerebrovascular Accident or TIA - No (0) [Creatinine >= 2mg/dL (1 Point)] : Creatinine >= 2mg/dL - No (0) [Insulin-dependent Diabetic (1 Point)] : Insulin-dependent Diabetic - No (0) [1] : 1 , RCRI Class: II, Risk of Post-Op Cardiac Complications: 6.0%, 95% CI for Risk Estimate: 4.9% - 7.4% [FreeTextEntry4] : 65 yo F with PMH listed above, patient has no personal or family history of adverse outcomes with anesthesia during pas surgical procedures, no history of poor wound healing or abnormal bleeding. She has no significant cardiac or pulmonary history and has no medical contraindications for upcoming surgical intervention for her pelvic organ prolapse. RCRI score 1 for intrabdominal nature of surgery.

## 2021-11-18 ENCOUNTER — OUTPATIENT (OUTPATIENT)
Dept: OUTPATIENT SERVICES | Facility: HOSPITAL | Age: 66
LOS: 1 days | End: 2021-11-18

## 2021-11-18 ENCOUNTER — APPOINTMENT (OUTPATIENT)
Dept: INTERNAL MEDICINE | Facility: CLINIC | Age: 66
End: 2021-11-18

## 2021-11-18 ENCOUNTER — RESULT REVIEW (OUTPATIENT)
Age: 66
End: 2021-11-18

## 2021-11-18 VITALS
SYSTOLIC BLOOD PRESSURE: 134 MMHG | DIASTOLIC BLOOD PRESSURE: 80 MMHG | HEART RATE: 68 BPM | BODY MASS INDEX: 33.29 KG/M2 | OXYGEN SATURATION: 98 % | WEIGHT: 195 LBS | HEIGHT: 64 IN

## 2021-11-18 VITALS — TEMPERATURE: 96.5 F

## 2021-11-18 DIAGNOSIS — Z98.890 OTHER SPECIFIED POSTPROCEDURAL STATES: Chronic | ICD-10-CM

## 2021-11-18 DIAGNOSIS — Z98.89 OTHER SPECIFIED POSTPROCEDURAL STATES: Chronic | ICD-10-CM

## 2021-11-18 LAB
ALBUMIN SERPL ELPH-MCNC: 4.4 G/DL — SIGNIFICANT CHANGE UP (ref 3.3–5)
ALP SERPL-CCNC: 87 U/L — SIGNIFICANT CHANGE UP (ref 40–120)
ALT FLD-CCNC: 13 U/L — SIGNIFICANT CHANGE UP (ref 4–33)
ANION GAP SERPL CALC-SCNC: 13 MMOL/L — SIGNIFICANT CHANGE UP (ref 7–14)
AST SERPL-CCNC: 21 U/L — SIGNIFICANT CHANGE UP (ref 4–32)
BILIRUB SERPL-MCNC: 0.2 MG/DL — SIGNIFICANT CHANGE UP (ref 0.2–1.2)
BUN SERPL-MCNC: 15 MG/DL — SIGNIFICANT CHANGE UP (ref 7–23)
CALCIUM SERPL-MCNC: 9.8 MG/DL — SIGNIFICANT CHANGE UP (ref 8.4–10.5)
CHLORIDE SERPL-SCNC: 101 MMOL/L — SIGNIFICANT CHANGE UP (ref 98–107)
CO2 SERPL-SCNC: 27 MMOL/L — SIGNIFICANT CHANGE UP (ref 22–31)
CREAT SERPL-MCNC: 0.67 MG/DL — SIGNIFICANT CHANGE UP (ref 0.5–1.3)
GLUCOSE SERPL-MCNC: 86 MG/DL — SIGNIFICANT CHANGE UP (ref 70–99)
POTASSIUM SERPL-MCNC: 3.5 MMOL/L — SIGNIFICANT CHANGE UP (ref 3.5–5.3)
POTASSIUM SERPL-SCNC: 3.5 MMOL/L — SIGNIFICANT CHANGE UP (ref 3.5–5.3)
PROT SERPL-MCNC: 7.7 G/DL — SIGNIFICANT CHANGE UP (ref 6–8.3)
SODIUM SERPL-SCNC: 141 MMOL/L — SIGNIFICANT CHANGE UP (ref 135–145)

## 2021-11-19 ENCOUNTER — NON-APPOINTMENT (OUTPATIENT)
Age: 66
End: 2021-11-19

## 2021-11-22 ENCOUNTER — OUTPATIENT (OUTPATIENT)
Dept: OUTPATIENT SERVICES | Facility: HOSPITAL | Age: 66
LOS: 1 days | End: 2021-11-22
Payer: SELF-PAY

## 2021-11-22 DIAGNOSIS — Z98.89 OTHER SPECIFIED POSTPROCEDURAL STATES: Chronic | ICD-10-CM

## 2021-11-22 DIAGNOSIS — Z11.52 ENCOUNTER FOR SCREENING FOR COVID-19: ICD-10-CM

## 2021-11-22 DIAGNOSIS — Z98.890 OTHER SPECIFIED POSTPROCEDURAL STATES: Chronic | ICD-10-CM

## 2021-11-22 LAB — SARS-COV-2 RNA SPEC QL NAA+PROBE: SIGNIFICANT CHANGE UP

## 2021-11-22 PROCEDURE — U0003: CPT

## 2021-11-22 PROCEDURE — C9803: CPT

## 2021-11-22 PROCEDURE — U0005: CPT

## 2021-11-23 ENCOUNTER — TRANSCRIPTION ENCOUNTER (OUTPATIENT)
Age: 66
End: 2021-11-23

## 2021-11-24 ENCOUNTER — TRANSCRIPTION ENCOUNTER (OUTPATIENT)
Age: 66
End: 2021-11-24

## 2021-11-24 ENCOUNTER — APPOINTMENT (OUTPATIENT)
Dept: UROGYNECOLOGY | Facility: HOSPITAL | Age: 66
End: 2021-11-24
Payer: MEDICAID

## 2021-11-24 ENCOUNTER — INPATIENT (INPATIENT)
Facility: HOSPITAL | Age: 66
LOS: 0 days | Discharge: HOME | DRG: 743 | End: 2021-11-25
Attending: STUDENT IN AN ORGANIZED HEALTH CARE EDUCATION/TRAINING PROGRAM | Admitting: STUDENT IN AN ORGANIZED HEALTH CARE EDUCATION/TRAINING PROGRAM
Payer: SELF-PAY

## 2021-11-24 VITALS
OXYGEN SATURATION: 98 % | TEMPERATURE: 98 F | SYSTOLIC BLOOD PRESSURE: 128 MMHG | WEIGHT: 192.02 LBS | HEART RATE: 65 BPM | HEIGHT: 62 IN | DIASTOLIC BLOOD PRESSURE: 66 MMHG | RESPIRATION RATE: 16 BRPM

## 2021-11-24 DIAGNOSIS — I10 ESSENTIAL (PRIMARY) HYPERTENSION: ICD-10-CM

## 2021-11-24 DIAGNOSIS — K21.9 GASTRO-ESOPHAGEAL REFLUX DISEASE WITHOUT ESOPHAGITIS: ICD-10-CM

## 2021-11-24 DIAGNOSIS — Z98.89 OTHER SPECIFIED POSTPROCEDURAL STATES: Chronic | ICD-10-CM

## 2021-11-24 DIAGNOSIS — Z98.890 OTHER SPECIFIED POSTPROCEDURAL STATES: Chronic | ICD-10-CM

## 2021-11-24 DIAGNOSIS — E78.5 HYPERLIPIDEMIA, UNSPECIFIED: ICD-10-CM

## 2021-11-24 DIAGNOSIS — R73.03 PREDIABETES: ICD-10-CM

## 2021-11-24 DIAGNOSIS — N81.4 UTEROVAGINAL PROLAPSE, UNSPECIFIED: ICD-10-CM

## 2021-11-24 DIAGNOSIS — Z01.818 ENCOUNTER FOR OTHER PREPROCEDURAL EXAMINATION: ICD-10-CM

## 2021-11-24 DIAGNOSIS — N81.2 INCOMPLETE UTEROVAGINAL PROLAPSE: ICD-10-CM

## 2021-11-24 DIAGNOSIS — E87.6 HYPOKALEMIA: ICD-10-CM

## 2021-11-24 LAB
GLUCOSE BLDC GLUCOMTR-MCNC: 120 MG/DL — HIGH (ref 70–99)
GLUCOSE BLDC GLUCOMTR-MCNC: 73 MG/DL — SIGNIFICANT CHANGE UP (ref 70–99)
HCT VFR BLD CALC: 41.8 % — SIGNIFICANT CHANGE UP (ref 34.5–45)
HGB BLD-MCNC: 13.5 G/DL — SIGNIFICANT CHANGE UP (ref 11.5–15.5)
RH IG SCN BLD-IMP: POSITIVE — SIGNIFICANT CHANGE UP

## 2021-11-24 PROCEDURE — 57425 LAPAROSCOPY SURG COLPOPEXY: CPT

## 2021-11-24 PROCEDURE — 58542 LSH W/T/O UT 250 G OR LESS: CPT

## 2021-11-24 PROCEDURE — 88305 TISSUE EXAM BY PATHOLOGIST: CPT | Mod: 26

## 2021-11-24 RX ORDER — METOPROLOL TARTRATE 50 MG
5 TABLET ORAL EVERY 6 HOURS
Refills: 0 | Status: DISCONTINUED | OUTPATIENT
Start: 2021-11-24 | End: 2021-11-25

## 2021-11-24 RX ORDER — SODIUM CHLORIDE 9 MG/ML
1000 INJECTION, SOLUTION INTRAVENOUS
Refills: 0 | Status: DISCONTINUED | OUTPATIENT
Start: 2021-11-24 | End: 2021-11-25

## 2021-11-24 RX ORDER — GABAPENTIN 400 MG/1
300 CAPSULE ORAL ONCE
Refills: 0 | Status: COMPLETED | OUTPATIENT
Start: 2021-11-24 | End: 2021-11-24

## 2021-11-24 RX ORDER — ONDANSETRON 8 MG/1
4 TABLET, FILM COATED ORAL EVERY 8 HOURS
Refills: 0 | Status: DISCONTINUED | OUTPATIENT
Start: 2021-11-24 | End: 2021-11-25

## 2021-11-24 RX ORDER — ACETAMINOPHEN 500 MG
1000 TABLET ORAL ONCE
Refills: 0 | Status: COMPLETED | OUTPATIENT
Start: 2021-11-24 | End: 2021-11-24

## 2021-11-24 RX ORDER — INSULIN LISPRO 100/ML
VIAL (ML) SUBCUTANEOUS
Refills: 0 | Status: DISCONTINUED | OUTPATIENT
Start: 2021-11-24 | End: 2021-11-25

## 2021-11-24 RX ORDER — SODIUM CHLORIDE 9 MG/ML
3 INJECTION INTRAMUSCULAR; INTRAVENOUS; SUBCUTANEOUS EVERY 8 HOURS
Refills: 0 | Status: DISCONTINUED | OUTPATIENT
Start: 2021-11-24 | End: 2021-11-24

## 2021-11-24 RX ORDER — IBUPROFEN 200 MG
1 TABLET ORAL
Qty: 0 | Refills: 0 | DISCHARGE
Start: 2021-11-24

## 2021-11-24 RX ORDER — ACETAMINOPHEN 500 MG
975 TABLET ORAL EVERY 6 HOURS
Refills: 0 | Status: DISCONTINUED | OUTPATIENT
Start: 2021-11-24 | End: 2021-11-25

## 2021-11-24 RX ORDER — LIDOCAINE HCL 20 MG/ML
0.2 VIAL (ML) INJECTION ONCE
Refills: 0 | Status: DISCONTINUED | OUTPATIENT
Start: 2021-11-24 | End: 2021-11-24

## 2021-11-24 RX ORDER — HYDROMORPHONE HYDROCHLORIDE 2 MG/ML
0.5 INJECTION INTRAMUSCULAR; INTRAVENOUS; SUBCUTANEOUS
Refills: 0 | Status: DISCONTINUED | OUTPATIENT
Start: 2021-11-24 | End: 2021-11-24

## 2021-11-24 RX ORDER — SODIUM CHLORIDE 9 MG/ML
1000 INJECTION, SOLUTION INTRAVENOUS
Refills: 0 | Status: DISCONTINUED | OUTPATIENT
Start: 2021-11-24 | End: 2021-11-24

## 2021-11-24 RX ORDER — GLUCAGON INJECTION, SOLUTION 0.5 MG/.1ML
1 INJECTION, SOLUTION SUBCUTANEOUS ONCE
Refills: 0 | Status: DISCONTINUED | OUTPATIENT
Start: 2021-11-24 | End: 2021-11-25

## 2021-11-24 RX ORDER — OXYCODONE HYDROCHLORIDE 5 MG/1
1 TABLET ORAL
Qty: 0 | Refills: 0 | DISCHARGE
Start: 2021-11-24

## 2021-11-24 RX ORDER — INSULIN LISPRO 100/ML
VIAL (ML) SUBCUTANEOUS AT BEDTIME
Refills: 0 | Status: DISCONTINUED | OUTPATIENT
Start: 2021-11-24 | End: 2021-11-25

## 2021-11-24 RX ORDER — FAMOTIDINE 10 MG/ML
20 INJECTION INTRAVENOUS DAILY
Refills: 0 | Status: DISCONTINUED | OUTPATIENT
Start: 2021-11-24 | End: 2021-11-25

## 2021-11-24 RX ORDER — DEXTROSE 50 % IN WATER 50 %
12.5 SYRINGE (ML) INTRAVENOUS ONCE
Refills: 0 | Status: DISCONTINUED | OUTPATIENT
Start: 2021-11-24 | End: 2021-11-25

## 2021-11-24 RX ORDER — DEXTROSE 50 % IN WATER 50 %
25 SYRINGE (ML) INTRAVENOUS ONCE
Refills: 0 | Status: DISCONTINUED | OUTPATIENT
Start: 2021-11-24 | End: 2021-11-25

## 2021-11-24 RX ORDER — OXYCODONE HYDROCHLORIDE 5 MG/1
5 TABLET ORAL EVERY 4 HOURS
Refills: 0 | Status: DISCONTINUED | OUTPATIENT
Start: 2021-11-24 | End: 2021-11-25

## 2021-11-24 RX ORDER — IBUPROFEN 200 MG
600 TABLET ORAL EVERY 6 HOURS
Refills: 0 | Status: DISCONTINUED | OUTPATIENT
Start: 2021-11-24 | End: 2021-11-25

## 2021-11-24 RX ORDER — ONDANSETRON 8 MG/1
4 TABLET, FILM COATED ORAL ONCE
Refills: 0 | Status: DISCONTINUED | OUTPATIENT
Start: 2021-11-24 | End: 2021-11-24

## 2021-11-24 RX ORDER — SIMETHICONE 80 MG/1
80 TABLET, CHEWABLE ORAL THREE TIMES A DAY
Refills: 0 | Status: DISCONTINUED | OUTPATIENT
Start: 2021-11-24 | End: 2021-11-25

## 2021-11-24 RX ORDER — DEXTROSE 50 % IN WATER 50 %
15 SYRINGE (ML) INTRAVENOUS ONCE
Refills: 0 | Status: DISCONTINUED | OUTPATIENT
Start: 2021-11-24 | End: 2021-11-25

## 2021-11-24 RX ORDER — CELECOXIB 200 MG/1
400 CAPSULE ORAL ONCE
Refills: 0 | Status: COMPLETED | OUTPATIENT
Start: 2021-11-24 | End: 2021-11-24

## 2021-11-24 RX ORDER — ACETAMINOPHEN 500 MG
3 TABLET ORAL
Qty: 0 | Refills: 0 | DISCHARGE
Start: 2021-11-24

## 2021-11-24 RX ADMIN — Medication 1000 MILLIGRAM(S): at 19:00

## 2021-11-24 RX ADMIN — Medication 1000 MILLIGRAM(S): at 11:18

## 2021-11-24 RX ADMIN — Medication 600 MILLIGRAM(S): at 21:05

## 2021-11-24 RX ADMIN — SODIUM CHLORIDE 75 MILLILITER(S): 9 INJECTION, SOLUTION INTRAVENOUS at 18:37

## 2021-11-24 RX ADMIN — Medication 600 MILLIGRAM(S): at 22:00

## 2021-11-24 RX ADMIN — SODIUM CHLORIDE 75 MILLILITER(S): 9 INJECTION, SOLUTION INTRAVENOUS at 17:25

## 2021-11-24 RX ADMIN — Medication 400 MILLIGRAM(S): at 18:37

## 2021-11-24 RX ADMIN — SIMETHICONE 80 MILLIGRAM(S): 80 TABLET, CHEWABLE ORAL at 21:06

## 2021-11-24 RX ADMIN — CELECOXIB 400 MILLIGRAM(S): 200 CAPSULE ORAL at 11:18

## 2021-11-24 RX ADMIN — GABAPENTIN 300 MILLIGRAM(S): 400 CAPSULE ORAL at 11:18

## 2021-11-24 NOTE — BRIEF OPERATIVE NOTE - ASSISTANT(S)
Dr Yary Cardona PGY7, Dr Cassandra Craig PGY2, Artur Joya [facility]

## 2021-11-24 NOTE — BRIEF OPERATIVE NOTE - OPERATION/FINDINGS
Stage 2 uterovaginal prolapse, cystocele. Multiple omental adhesions to abdominal sidewalls.   On cystoscopy, normal bladder mucosa. No suture, mesh, injury, or foreign body noted. Bilateral ureteral orifices identified and effluxing clear yellow urine.   5-6cm umbilical hernia

## 2021-11-24 NOTE — DISCHARGE NOTE PROVIDER - NSDCMRMEDTOKEN_GEN_ALL_CORE_FT
acetaminophen 325 mg oral tablet: 3 tab(s) orally every 6 hours  amLODIPine 10 mg oral tablet: 1 tab(s) orally once a day  atorvastatin 20 mg oral tablet: 1 tab(s) orally once a day  hydroCHLOROthiazide 25 mg oral tablet: 1 tab(s) orally once a day  ibuprofen 600 mg oral tablet: 1 tab(s) orally every 6 hours  metFORMIN 500 mg oral tablet: 1 tab(s) orally 2 times a day, last dose 24 hours before surger  oxyCODONE 5 mg oral tablet: 1 tab(s) orally every 4 hours, As needed, Severe Pain (7 - 10)

## 2021-11-24 NOTE — BRIEF OPERATIVE NOTE - NSICDXBRIEFPROCEDURE_GEN_ALL_CORE_FT
PROCEDURES:  Robot-assisted laparoscopic supracervical hysterectomy with salpingo-oophorectomy and sacrocolpopexy 24-Nov-2021 16:09:26  Yary Cardona  Cystoscopy 24-Nov-2021 16:09:35  Yary Cardona

## 2021-11-24 NOTE — DISCHARGE NOTE PROVIDER - NSDCFUADDINST_GEN_ALL_CORE_FT
Postoperative Instructions  Bowel regimen To avoid constipation and straining, we suggest the following (simultaneously): 1. Colace (stool softener) 100mg, one pill three times a day (breakfast, lunch, dinner). If stool is too soft, decrease to twice a day (breakfast, dinner).  2. If still constipated, you can add: Miralax once at bedtime.   All of these agents can be obtained over the counter at the pharmacy.  Pain control For pain control, take the followin. Motrin 800mg three times a day, or 600mg four times a day, take with food 2. Add Tylenol as needed if still have pain despite Motrin   Motrin and Tylenol can be obtained over the counter.   Postoperative urinary catheter If you failed your voiding trial in the hospital and are sent home with a catheter, please call the office ([phone]) so you can come in to have a repeat voiding trial/catheter removal in a few days.   Postoperative restrictions Nothing in the vagina (tampons, sexual intercourse), No tub baths, pools or hot tubs for 6 weeks (showers are ok!). No lifting anything heavier than 10 lbs, no strenuous exercise for 2 weeks after surgery. Do not pull or cut any stitches that you see around your incision.   Vaginal bleeding Spotting and intermittent passage of blood clots per vagina is normal in first few weeks after surgery. If you are soaking 1 pad per hour, that is not normal and you should notify my office and seek medical attention right away.  Vaginal discharge Vaginal discharge (all colors) is normal after vaginal surgery. If you’ve had vaginal surgery, you have sutures in your vagina which take 3 months to fully absorb. You may have vaginal discharge during this time. This is normal.

## 2021-11-24 NOTE — DISCHARGE NOTE PROVIDER - NSDCFUSCHEDAPPT_GEN_ALL_CORE_FT
MATT LOZANO ; 12/01/2021 ; NPP Intmed OP [id number] [facility]  MATT LOZANO ; 12/14/2021 ; NPP OB/GYN Urology [id number]

## 2021-11-24 NOTE — PRE-ANESTHESIA EVALUATION ADULT - MALLAMPATI CLASS
Routine safety standards initiated.  Routine nursing standards initiated.   Class II - visualization of the soft palate, fauces, and uvula

## 2021-11-24 NOTE — BRIEF OPERATIVE NOTE - NSICDXBRIEFPOSTOP_GEN_ALL_CORE_FT
POST-OP DIAGNOSIS:  Incomplete uterovaginal prolapse 24-Nov-2021 16:10:32  Yary Cardona  Midline cystocele 24-Nov-2021 16:10:30  Yary Cardona

## 2021-11-24 NOTE — DISCHARGE NOTE PROVIDER - CARE PROVIDER_API CALL
Candy Christopher (MD)  Female Pelvic MedReconst Surg; Obstetrics and Gynecology  [address], [address]  [place] [address]  Phone: [phone]  Fax: [phone]  Established Patient  Scheduled Appointment: 12/14/2021

## 2021-11-24 NOTE — DISCHARGE NOTE PROVIDER - HOSPITAL COURSE
Pt underwent uncomplicated Robot-Assisted Supracervical Hysterectomy, Bilateral-Salpingoopherectomy, Sacral Colpopexy, cystoscopy. EBL: 100 mL. Please see operative note for full details. Patient was transferred to recovery room in stable condition.   On POD#1, no overnight events. Patient underwent an active TOV ***. Postoperative course was uncomplicated.  On the day of discharge the patient was stable and afebrile, voiding spontaneously, tolerating regular diet, ambulating at baseline and had pain well controlled with oral pain medications.  Patient to have close follow up with Dr. Christopher outpatient. Pt underwent uncomplicated Robot-Assisted Supracervical Hysterectomy, Bilateral-Salpingoopherectomy, Sacral Colpopexy, cystoscopy. EBL: 100 mL. Please see operative note for full details. Patient was transferred to recovery room in stable condition.   On POD#1, no overnight events. Patient underwent an active TOV, passed without complications. Xiong catheter removed. Postoperative course was uncomplicated.  On the day of discharge the patient was stable and afebrile, voiding spontaneously, tolerating regular diet, ambulating at baseline and had pain well controlled with oral pain medications.  Patient to have close follow up with Dr. Christopher outpatient.

## 2021-11-24 NOTE — DISCHARGE NOTE PROVIDER - NSDCCPTREATMENT_GEN_ALL_CORE_FT
PRINCIPAL PROCEDURE  Procedure: Robot-assisted laparoscopic supracervical hysterectomy with salpingo-oophorectomy and sacrocolpopexy  Findings and Treatment:       SECONDARY PROCEDURE  Procedure: Cystoscopy  Findings and Treatment:

## 2021-11-25 ENCOUNTER — TRANSCRIPTION ENCOUNTER (OUTPATIENT)
Age: 66
End: 2021-11-25

## 2021-11-25 VITALS
TEMPERATURE: 98 F | HEART RATE: 72 BPM | SYSTOLIC BLOOD PRESSURE: 105 MMHG | RESPIRATION RATE: 18 BRPM | DIASTOLIC BLOOD PRESSURE: 68 MMHG | OXYGEN SATURATION: 100 %

## 2021-11-25 LAB
ANION GAP SERPL CALC-SCNC: 15 MMOL/L — SIGNIFICANT CHANGE UP (ref 5–17)
BASOPHILS # BLD AUTO: 0.03 K/UL — SIGNIFICANT CHANGE UP (ref 0–0.2)
BASOPHILS NFR BLD AUTO: 0.3 % — SIGNIFICANT CHANGE UP (ref 0–2)
BUN SERPL-MCNC: 13 MG/DL — SIGNIFICANT CHANGE UP (ref 7–23)
CALCIUM SERPL-MCNC: 9.3 MG/DL — SIGNIFICANT CHANGE UP (ref 8.4–10.5)
CHLORIDE SERPL-SCNC: 102 MMOL/L — SIGNIFICANT CHANGE UP (ref 96–108)
CO2 SERPL-SCNC: 22 MMOL/L — SIGNIFICANT CHANGE UP (ref 22–31)
CREAT SERPL-MCNC: 0.58 MG/DL — SIGNIFICANT CHANGE UP (ref 0.5–1.3)
EOSINOPHIL # BLD AUTO: 0 K/UL — SIGNIFICANT CHANGE UP (ref 0–0.5)
EOSINOPHIL NFR BLD AUTO: 0 % — SIGNIFICANT CHANGE UP (ref 0–6)
GLUCOSE BLDC GLUCOMTR-MCNC: 108 MG/DL — HIGH (ref 70–99)
GLUCOSE BLDC GLUCOMTR-MCNC: 121 MG/DL — HIGH (ref 70–99)
GLUCOSE BLDC GLUCOMTR-MCNC: 128 MG/DL — HIGH (ref 70–99)
GLUCOSE SERPL-MCNC: 133 MG/DL — HIGH (ref 70–99)
HCT VFR BLD CALC: 40.6 % — SIGNIFICANT CHANGE UP (ref 34.5–45)
HGB BLD-MCNC: 12.7 G/DL — SIGNIFICANT CHANGE UP (ref 11.5–15.5)
IMM GRANULOCYTES NFR BLD AUTO: 0.6 % — SIGNIFICANT CHANGE UP (ref 0–1.5)
LYMPHOCYTES # BLD AUTO: 2.15 K/UL — SIGNIFICANT CHANGE UP (ref 1–3.3)
LYMPHOCYTES # BLD AUTO: 22 % — SIGNIFICANT CHANGE UP (ref 13–44)
MCHC RBC-ENTMCNC: 25.5 PG — LOW (ref 27–34)
MCHC RBC-ENTMCNC: 31.3 GM/DL — LOW (ref 32–36)
MCV RBC AUTO: 81.5 FL — SIGNIFICANT CHANGE UP (ref 80–100)
MONOCYTES # BLD AUTO: 0.51 K/UL — SIGNIFICANT CHANGE UP (ref 0–0.9)
MONOCYTES NFR BLD AUTO: 5.2 % — SIGNIFICANT CHANGE UP (ref 2–14)
NEUTROPHILS # BLD AUTO: 7.04 K/UL — SIGNIFICANT CHANGE UP (ref 1.8–7.4)
NEUTROPHILS NFR BLD AUTO: 71.9 % — SIGNIFICANT CHANGE UP (ref 43–77)
NRBC # BLD: 0 /100 WBCS — SIGNIFICANT CHANGE UP (ref 0–0)
PLATELET # BLD AUTO: 302 K/UL — SIGNIFICANT CHANGE UP (ref 150–400)
POTASSIUM SERPL-MCNC: 3.6 MMOL/L — SIGNIFICANT CHANGE UP (ref 3.5–5.3)
POTASSIUM SERPL-SCNC: 3.6 MMOL/L — SIGNIFICANT CHANGE UP (ref 3.5–5.3)
RBC # BLD: 4.98 M/UL — SIGNIFICANT CHANGE UP (ref 3.8–5.2)
RBC # FLD: 14.6 % — HIGH (ref 10.3–14.5)
SODIUM SERPL-SCNC: 139 MMOL/L — SIGNIFICANT CHANGE UP (ref 135–145)
WBC # BLD: 9.79 K/UL — SIGNIFICANT CHANGE UP (ref 3.8–10.5)
WBC # FLD AUTO: 9.79 K/UL — SIGNIFICANT CHANGE UP (ref 3.8–10.5)

## 2021-11-25 PROCEDURE — 36415 COLL VENOUS BLD VENIPUNCTURE: CPT

## 2021-11-25 PROCEDURE — 85014 HEMATOCRIT: CPT

## 2021-11-25 PROCEDURE — C1889: CPT

## 2021-11-25 PROCEDURE — 85025 COMPLETE CBC W/AUTO DIFF WBC: CPT

## 2021-11-25 PROCEDURE — C1781: CPT

## 2021-11-25 PROCEDURE — 85018 HEMOGLOBIN: CPT

## 2021-11-25 PROCEDURE — S2900: CPT

## 2021-11-25 PROCEDURE — 82962 GLUCOSE BLOOD TEST: CPT

## 2021-11-25 PROCEDURE — 88305 TISSUE EXAM BY PATHOLOGIST: CPT

## 2021-11-25 PROCEDURE — C9399: CPT

## 2021-11-25 PROCEDURE — 80048 BASIC METABOLIC PNL TOTAL CA: CPT

## 2021-11-25 RX ADMIN — SIMETHICONE 80 MILLIGRAM(S): 80 TABLET, CHEWABLE ORAL at 06:02

## 2021-11-25 RX ADMIN — Medication 600 MILLIGRAM(S): at 10:08

## 2021-11-25 RX ADMIN — Medication 600 MILLIGRAM(S): at 09:38

## 2021-11-25 RX ADMIN — Medication 600 MILLIGRAM(S): at 15:35

## 2021-11-25 RX ADMIN — Medication 975 MILLIGRAM(S): at 13:41

## 2021-11-25 RX ADMIN — SIMETHICONE 80 MILLIGRAM(S): 80 TABLET, CHEWABLE ORAL at 13:12

## 2021-11-25 RX ADMIN — Medication 975 MILLIGRAM(S): at 06:01

## 2021-11-25 RX ADMIN — Medication 600 MILLIGRAM(S): at 16:05

## 2021-11-25 RX ADMIN — Medication 975 MILLIGRAM(S): at 13:11

## 2021-11-25 RX ADMIN — Medication 975 MILLIGRAM(S): at 06:39

## 2021-11-25 RX ADMIN — FAMOTIDINE 20 MILLIGRAM(S): 10 INJECTION INTRAVENOUS at 13:11

## 2021-11-25 NOTE — PROGRESS NOTE ADULT - ASSESSMENT
Pt is a 65yo POD#1 from RA STEFFANIE, BSO, SCP, cysto doing well postoperatively.    Neuro: c/w po pain meds  CV: Hemodynamically stable, CBC from AM stable, c/w lopressor PRN  Pulm: Saturating well on room air, encourage incentive spirometry  GI: c/w regular diet, pepcid, zofran, simethicone   : UO adequate, freed catheter in place, TOV this am after breakfast  Heme: c/w SCDs for DVT ppx  Endo: c/w FS & ISS   Dispo: Continue routine post-op care, for TOV later this AM    Sylvia Woodall,PGY2

## 2021-11-25 NOTE — PROGRESS NOTE ADULT - SUBJECTIVE AND OBJECTIVE BOX
Urogyn Progress Note    Patient seen and examined at bedside, no acute overnight events. No acute complaints, pain well controlled. Patient is ambulating and tolerating clears. Has not yet passed flatus. Xiong is still in place. Denies CP, SOB, N/V, fevers, and chills.    Vital Signs Last 24 Hours  T(C): 37.1 (11-25-21 @ 05:10), Max: 37.1 (11-25-21 @ 05:10)  HR: 75 (11-25-21 @ 00:18) (63 - 76)  BP: 95/56 (11-25-21 @ 00:18) (86/51 - 128/66)  RR: 18 (11-25-21 @ 05:10) (15 - 18)  SpO2: 97% (11-25-21 @ 05:10) (94% - 100%)    I&O's Summary    24 Nov 2021 07:01  -  25 Nov 2021 07:00  --------------------------------------------------------  IN: 400 mL / OUT: 1255 mL / NET: -855 mL        Physical Exam:  General: NAD  CV: RRR  Lungs: CTA-B  Abdomen: Soft, expected tenderness, non-distended, +BS  Incision: Robotic incisions CDI w/ op-sites & steri strips in place  Ext: No pain or swelling    Labs:             12.7   9.79  )-----------( 302      ( 11-25 @ 06:27 )             40.6                13.5   x     )-----------( x        ( 11-24 @ 19:29 )             41.8         MEDICATIONS  (STANDING):  acetaminophen     Tablet .. 975 milliGRAM(s) Oral every 6 hours  cefoTEtan  IVPB 2 Gram(s) IV Intermittent once  dextrose 40% Gel 15 Gram(s) Oral once  dextrose 5%. 1000 milliLiter(s) (50 mL/Hr) IV Continuous <Continuous>  dextrose 5%. 1000 milliLiter(s) (100 mL/Hr) IV Continuous <Continuous>  dextrose 50% Injectable 25 Gram(s) IV Push once  dextrose 50% Injectable 12.5 Gram(s) IV Push once  dextrose 50% Injectable 25 Gram(s) IV Push once  famotidine    Tablet 20 milliGRAM(s) Oral daily  glucagon  Injectable 1 milliGRAM(s) IntraMuscular once  ibuprofen  Tablet. 600 milliGRAM(s) Oral every 6 hours  insulin lispro (ADMELOG) corrective regimen sliding scale   SubCutaneous three times a day before meals  insulin lispro (ADMELOG) corrective regimen sliding scale   SubCutaneous at bedtime  lactated ringers. 1000 milliLiter(s) (75 mL/Hr) IV Continuous <Continuous>  simethicone 80 milliGRAM(s) Chew three times a day    MEDICATIONS  (PRN):  metoprolol tartrate Injectable 5 milliGRAM(s) IV Push every 6 hours PRN SBP > 160 and/or DBP > 110  ondansetron Injectable 4 milliGRAM(s) IV Push every 8 hours PRN Nausea and/or Vomiting  oxyCODONE    IR 5 milliGRAM(s) Oral every 4 hours PRN Severe Pain (7 - 10)

## 2021-11-25 NOTE — PROGRESS NOTE ADULT - PROVIDER SPECIALTY LIST ADULT
Spoke with patient via phone.   Last INR 10-01-19 was 2.4.  Dose maintained per protocol.   10-31-19's INR is 2.6 and is within goal range.    Current warfarin dosing verified with patient. Patient was informed that their INR result is within therapeutic range and instructed to maintain current dose per protocol. Discussed dose and return date for next INR.    Mariela Bolden NP is in the office today supervising the treatment.    INR=2.4.  Continue current coumadin dose of: 5mg every Tues and 2.5mg on all of the remaining days of the week. Recheck INR in 3-4  weeks.     Patient declined to schedule AAC appointment and stated \"it's just easier\" to go to ACL lab for blood draws.     Patient was instructed to contact the clinic with any unusual bleeding or bruising, any changes in medications, diet, health status, lifestyle, or any other changes, questions or concerns. Patient verbalized understanding of all discussed.     
Left message for patient to call back.    Please transfer call to an INR RN  Please discuss INR results, warfarin dosing instructions, INR recheck date and patient findings when call is returned.    INR=2.6   Maintain current dose of 5mg Tuesdays and 2.5mg all of the remaining days of the week per protocol. Recheck INR in 2-3 weeks.     
GYN

## 2021-11-25 NOTE — CHART NOTE - NSCHARTNOTEFT_GEN_A_CORE
Gynecology Chart Note    Pt eval at bedside for trial of void.  Pain well controlled.      Bladder fully drained through freed catheter.  Retrograde filled with 400cc sterile water then clamped.    Freed removed. Patient assisted to restroom.  Given necessary time to void (20 minutes).    Patient voided 400cc of yellow urine, passing TOV.    To be discharged home without freed catheter.    Sylvia Wooadll,PGY2
R1 POST-OP CHECK NOTE    SUBJECTIVE: 65yo F now POD0 s/p RA STEFFANIE, BSO, SC, Cysto.     Pt reports pain well controlled by pain meds.  She is not yet out of bed.  Freed catheter in place. She has not yet tried anything po. She denies n/v. She denies fever, chills, nausea, vomiting, headache, dizziness, chest pain, palpitations, shortness of breath.    acetaminophen     Tablet .. 975 milliGRAM(s) Oral every 6 hours  cefoTEtan  IVPB 2 Gram(s) IV Intermittent once  dextrose 40% Gel 15 Gram(s) Oral once  dextrose 5%. 1000 milliLiter(s) IV Continuous <Continuous>  dextrose 5%. 1000 milliLiter(s) IV Continuous <Continuous>  dextrose 50% Injectable 25 Gram(s) IV Push once  dextrose 50% Injectable 12.5 Gram(s) IV Push once  dextrose 50% Injectable 25 Gram(s) IV Push once  famotidine    Tablet 20 milliGRAM(s) Oral daily  glucagon  Injectable 1 milliGRAM(s) IntraMuscular once  ibuprofen  Tablet. 600 milliGRAM(s) Oral every 6 hours  insulin lispro (ADMELOG) corrective regimen sliding scale   SubCutaneous three times a day before meals  insulin lispro (ADMELOG) corrective regimen sliding scale   SubCutaneous at bedtime  lactated ringers. 1000 milliLiter(s) IV Continuous <Continuous>  metoprolol tartrate Injectable 5 milliGRAM(s) IV Push every 6 hours PRN  ondansetron Injectable 4 milliGRAM(s) IV Push every 8 hours PRN  oxyCODONE    IR 5 milliGRAM(s) Oral every 4 hours PRN  simethicone 80 milliGRAM(s) Chew three times a day      OBJECTIVE:    VITAL SIGNS:  Vital Signs Last 24 Hrs  T(C): 36 (24 Nov 2021 16:06), Max: 36.7 (24 Nov 2021 11:27)  T(F): 96.8 (24 Nov 2021 16:06), Max: 98.1 (24 Nov 2021 11:27)  HR: 63 (24 Nov 2021 17:00) (63 - 67)  BP: 97/55 (24 Nov 2021 17:00) (86/51 - 128/66)  BP(mean): 72 (24 Nov 2021 17:00) (67 - 72)  RR: 17 (24 Nov 2021 17:00) (15 - 17)  SpO2: 96% (24 Nov 2021 17:00) (94% - 100%)  CAPILLARY BLOOD GLUCOSE      POCT Blood Glucose.: 120 mg/dL (24 Nov 2021 16:38)  POCT Blood Glucose.: 73 mg/dL (24 Nov 2021 11:04)      11-24-21 @ 07:01  -  11-24-21 @ 17:44  --------------------------------------------------------  IN: 75 mL / OUT: 40 mL / NET: 35 mL        PHYSICAL EXAM:  GEN: NAD, A&Ox3  CV: RRR, no m/g/r  LUNGS: CTAB  ABD: soft, appropriately tender, ND, +BS  Incision: CDI, dressings in place  EXT: WWP, no edema/TTP    ASSESSMENT:  65yo F now POD0 s/p RA STEFFANIE, BSO, SC, Cysto.   Patient is stable and progressing normally postoperatively.    NEURO: pain well controlled on current regimen  CV: hemodyamically stable, f/u 7p CBC  PULM: increase incentive spirometry  GI: advance diet as tolerated  : continue freed. active TOV in AM  HEME: SCDs, early ambulation  ID: afebrile    Kallie Yusuf, PGY1

## 2021-11-25 NOTE — DISCHARGE NOTE NURSING/CASE MANAGEMENT/SOCIAL WORK - PATIENT PORTAL LINK FT
You can access the FollowMyHealth Patient Portal offered by [facility] by registering at the following website: [email or web address]. By joining [facility]’s FollowMyHealth portal, you will also be able to view your health information using other applications (apps) compatible with our system.

## 2021-12-01 ENCOUNTER — APPOINTMENT (OUTPATIENT)
Dept: INTERNAL MEDICINE | Facility: CLINIC | Age: 66
End: 2021-12-01
Payer: COMMERCIAL

## 2021-12-01 ENCOUNTER — RESULT REVIEW (OUTPATIENT)
Age: 66
End: 2021-12-01

## 2021-12-01 ENCOUNTER — OUTPATIENT (OUTPATIENT)
Dept: OUTPATIENT SERVICES | Facility: HOSPITAL | Age: 66
LOS: 1 days | End: 2021-12-01

## 2021-12-01 VITALS
WEIGHT: 192 LBS | BODY MASS INDEX: 32.78 KG/M2 | OXYGEN SATURATION: 100 % | HEIGHT: 64 IN | SYSTOLIC BLOOD PRESSURE: 122 MMHG | HEART RATE: 97 BPM | RESPIRATION RATE: 16 BRPM | DIASTOLIC BLOOD PRESSURE: 80 MMHG

## 2021-12-01 DIAGNOSIS — E87.6 HYPOKALEMIA: ICD-10-CM

## 2021-12-01 DIAGNOSIS — E78.5 HYPERLIPIDEMIA, UNSPECIFIED: ICD-10-CM

## 2021-12-01 DIAGNOSIS — Z98.890 OTHER SPECIFIED POSTPROCEDURAL STATES: Chronic | ICD-10-CM

## 2021-12-01 DIAGNOSIS — N81.4 UTEROVAGINAL PROLAPSE, UNSPECIFIED: ICD-10-CM

## 2021-12-01 DIAGNOSIS — R73.03 PREDIABETES: ICD-10-CM

## 2021-12-01 DIAGNOSIS — I10 ESSENTIAL (PRIMARY) HYPERTENSION: ICD-10-CM

## 2021-12-01 DIAGNOSIS — Z23 ENCOUNTER FOR IMMUNIZATION: ICD-10-CM

## 2021-12-01 DIAGNOSIS — Z98.89 OTHER SPECIFIED POSTPROCEDURAL STATES: Chronic | ICD-10-CM

## 2021-12-01 DIAGNOSIS — K21.9 GASTRO-ESOPHAGEAL REFLUX DISEASE WITHOUT ESOPHAGITIS: ICD-10-CM

## 2021-12-01 LAB
ANION GAP SERPL CALC-SCNC: 12 MMOL/L — SIGNIFICANT CHANGE UP (ref 7–14)
BUN SERPL-MCNC: 18 MG/DL — SIGNIFICANT CHANGE UP (ref 7–23)
CALCIUM SERPL-MCNC: 10 MG/DL — SIGNIFICANT CHANGE UP (ref 8.4–10.5)
CHLORIDE SERPL-SCNC: 102 MMOL/L — SIGNIFICANT CHANGE UP (ref 98–107)
CO2 SERPL-SCNC: 29 MMOL/L — SIGNIFICANT CHANGE UP (ref 22–31)
CREAT SERPL-MCNC: 0.94 MG/DL — SIGNIFICANT CHANGE UP (ref 0.5–1.3)
GLUCOSE SERPL-MCNC: 114 MG/DL — HIGH (ref 70–99)
POTASSIUM SERPL-MCNC: 3.5 MMOL/L — SIGNIFICANT CHANGE UP (ref 3.5–5.3)
POTASSIUM SERPL-SCNC: 3.5 MMOL/L — SIGNIFICANT CHANGE UP (ref 3.5–5.3)
SODIUM SERPL-SCNC: 143 MMOL/L — SIGNIFICANT CHANGE UP (ref 135–145)

## 2021-12-01 PROCEDURE — ZZZZZ: CPT

## 2021-12-01 RX ORDER — FLUTICASONE PROPIONATE 50 UG/1
50 SPRAY, METERED NASAL TWICE DAILY
Qty: 1 | Refills: 5 | Status: DISCONTINUED | COMMUNITY
Start: 2018-01-03 | End: 2021-12-01

## 2021-12-01 RX ORDER — POTASSIUM CHLORIDE 1500 MG/1
20 TABLET, FILM COATED, EXTENDED RELEASE ORAL DAILY
Qty: 7 | Refills: 0 | Status: DISCONTINUED | COMMUNITY
Start: 2021-11-12 | End: 2021-12-01

## 2021-12-02 ENCOUNTER — NON-APPOINTMENT (OUTPATIENT)
Age: 66
End: 2021-12-02

## 2021-12-06 LAB — SURGICAL PATHOLOGY STUDY: SIGNIFICANT CHANGE UP

## 2021-12-07 DIAGNOSIS — K59.09 OTHER CONSTIPATION: ICD-10-CM

## 2021-12-07 DIAGNOSIS — E87.6 HYPOKALEMIA: ICD-10-CM

## 2021-12-07 DIAGNOSIS — N81.4 UTEROVAGINAL PROLAPSE, UNSPECIFIED: ICD-10-CM

## 2021-12-07 DIAGNOSIS — E11.9 TYPE 2 DIABETES MELLITUS WITHOUT COMPLICATIONS: ICD-10-CM

## 2021-12-07 DIAGNOSIS — K21.9 GASTRO-ESOPHAGEAL REFLUX DISEASE WITHOUT ESOPHAGITIS: ICD-10-CM

## 2021-12-07 DIAGNOSIS — I10 ESSENTIAL (PRIMARY) HYPERTENSION: ICD-10-CM

## 2021-12-09 NOTE — PLAN
[FreeTextEntry1] : #PreDM \par A1C of 5.8 on 11/10/21 \par cw metformin, change to metformin ER 1000mg daily to improve adherence\par \par #HTN \par Cw HCTZ 25mg daily  \par Cw amlodipine 10mg daily (trace peripheral edema likely 2/2 CCB)\par \par #HLD \par Lipid panel today \par Cw atorvastatin 20mg daily \par \par #Hypokalemia \par Preop labs with K of 3.3, started on Kcl 20mEq qd x 7 days, finished ~1 week ago \par Repeat BMP today \par Will consider continuing KCl if K low again  \par Pt seems to eat K rich diet at baseline, encouraged to increase intake of these foods such as bananas, leafy greens, nuts, beans.  \par \par #GERD \par Start Zantac 10mg BID  \par \par #HCM \par UTD on flu  \par Due for Shingrix, pneumovax - defers at this time.  \par \par #social determinants of health \par Pt has been unemployed, formerly worked as live-in  and childcare provider, but has been unemployed during pandemic. Cost of medications and healthcare is significant concern for Pt.   \par Will send rx to [facility] pharmacy moving forward to lower cost of medications.  \par \par

## 2021-12-09 NOTE — HISTORY OF PRESENT ILLNESS
[FreeTextEntry1] : routine follow up, burning in throat [de-identified] : 66F with PMH of HTN, HLD, prediabetes, uterine prolapse presenting for routine follow up with chief complaint of throat burning \par \par #uterine prolapse \par Pt had surgery on 11/24 for uterine prolapse. Some mild pain at umbilical incision site, otherwise no pain or discomfort. No dysuria; reports improvement in urgency and resolution of incontinence. Taking ibuprofen 600mg prn for pain, usually 2x per day. Improvement in constipation after surgery.  Pt requests letter supporting eligibility for emergency medicaid for treatment of this condition.  \par \par #DM  \par Pt denies AE effect of Metformin, reports usually only takes Metformin 500mg daily instead of BID due to forgetting PM dose.  \par \par #HTN \par BP today 122/80. Does not have BP machine to do home readings \par \par #GERD \par Pt reports bothersome burning sensation in throat, worse lying down and in AM. Previously recommended ranitidine but did not try it. Interested in trying now.  \par \par  #constipation \par Previously with BM 1x per week, attributed to prolapse. Now with increased frequency of stooling, up to 2x per day.  Reports took senna 2 tablets after surgery PRN to prevent constipation.  \par \par  #HCM \par Got COVID booster on 11/16. (got original vax series in march).  \par Reports never had chickenpox as child so wonders if she needs shingles vax.  \par Reluctant to get further vaccinations at this time due to past hx of cellulitis after vaccine.  \par

## 2021-12-09 NOTE — CURRENT MEDS
[Other ___] : [unfilled] [No] : Did not review medication list for presence of high-risk medications. [Takes medication as prescribed] : does not take

## 2021-12-09 NOTE — PHYSICAL EXAM
[No Acute Distress] : no acute distress [Well Nourished] : well nourished [Well Developed] : well developed [Well-Appearing] : well-appearing [Normal Sclera/Conjunctiva] : normal sclera/conjunctiva [PERRL] : pupils equal round and reactive to light [EOMI] : extraocular movements intact [Normal Outer Ear/Nose] : the outer ears and nose were normal in appearance [Normal Oropharynx] : the oropharynx was normal [No JVD] : no jugular venous distention [No Lymphadenopathy] : no lymphadenopathy [Supple] : supple [Thyroid Normal, No Nodules] : the thyroid was normal and there were no nodules present [No Respiratory Distress] : no respiratory distress  [No Accessory Muscle Use] : no accessory muscle use [Clear to Auscultation] : lungs were clear to auscultation bilaterally [Normal Rate] : normal rate  [Regular Rhythm] : with a regular rhythm [Normal S1, S2] : normal S1 and S2 [No Murmur] : no murmur heard [No Carotid Bruits] : no carotid bruits [No Abdominal Bruit] : a ~M bruit was not heard ~T in the abdomen [No Varicosities] : no varicosities [Pedal Pulses Present] : the pedal pulses are present [No Palpable Aorta] : no palpable aorta [No Extremity Clubbing/Cyanosis] : no extremity clubbing/cyanosis [Soft] : abdomen soft [Non Tender] : non-tender [Non-distended] : non-distended [No Masses] : no abdominal mass palpated [No HSM] : no HSM [Normal Bowel Sounds] : normal bowel sounds [Normal Posterior Cervical Nodes] : no posterior cervical lymphadenopathy [Normal Anterior Cervical Nodes] : no anterior cervical lymphadenopathy [No CVA Tenderness] : no CVA  tenderness [No Spinal Tenderness] : no spinal tenderness [No Joint Swelling] : no joint swelling [Grossly Normal Strength/Tone] : grossly normal strength/tone [No Rash] : no rash [Coordination Grossly Intact] : coordination grossly intact [No Focal Deficits] : no focal deficits [Normal Gait] : normal gait [Deep Tendon Reflexes (DTR)] : deep tendon reflexes were 2+ and symmetric [Normal Affect] : the affect was normal [Normal Insight/Judgement] : insight and judgment were intact [de-identified] : trace b/l edema of feet

## 2021-12-14 ENCOUNTER — OUTPATIENT (OUTPATIENT)
Dept: OUTPATIENT SERVICES | Facility: HOSPITAL | Age: 66
LOS: 1 days | End: 2021-12-14
Payer: SELF-PAY

## 2021-12-14 ENCOUNTER — APPOINTMENT (OUTPATIENT)
Dept: UROGYNECOLOGY | Facility: CLINIC | Age: 66
End: 2021-12-14

## 2021-12-14 DIAGNOSIS — Z98.890 OTHER SPECIFIED POSTPROCEDURAL STATES: Chronic | ICD-10-CM

## 2021-12-14 DIAGNOSIS — N36.8 OTHER SPECIFIED DISORDERS OF URETHRA: ICD-10-CM

## 2021-12-14 DIAGNOSIS — Z98.890 OTHER SPECIFIED POSTPROCEDURAL STATES: ICD-10-CM

## 2021-12-14 DIAGNOSIS — Z98.89 OTHER SPECIFIED POSTPROCEDURAL STATES: Chronic | ICD-10-CM

## 2021-12-14 PROCEDURE — G0463: CPT

## 2021-12-14 NOTE — SUBJECTIVE
[FreeTextEntry1] : No acute distress [FreeTextEntry8] : None [FreeTextEntry7] : None [FreeTextEntry6] : Normal, no nausea/vomiting [FreeTextEntry5] : Normal. Denies dysuria, incontinence, incomplete emptying [FreeTextEntry4] : Normal. Some constipation, taking senna as needed for it.  [FreeTextEntry9] : No vaginal bleeding

## 2021-12-14 NOTE — DISCUSSION/SUMMARY
[Post-Op instructions given. Pt/family verbalizes understanding] : post-operative instructions were provided to the patient/family who verbalize understanding [Risks/Benefits discussed. Pt/family verbalizes understanding] : risks and benefits of the procedure were discussed with the patient/family who verbalize understanding [FreeTextEntry1] : \par Qiana is s/p robotic assisted supracervical hysterectomy, bilateral salpingo-oophorectomy, sacrocolpopexy, cystoscopy on 11/24/21. She is doing well, meeting postop milestones. We discussed adding on a fiber supplement and increasing fluids to help with stool consistency and daily miralax instead of senna for constipation. Pathology not yet available. Postop instructions reviewed. \par \par RTO 4 weeks.

## 2021-12-14 NOTE — OBJECTIVE
[Soft and Nontender] : soft and nontender [Clean, Dry, Intact] : Clean, Dry, Intact [Good Support] : Good support [Healing well] : healing well [No Masses or Tenderness] : no masses or tenderness [Post Void Residual ____ ml] : Post Void Residual was [unfilled] ml [FreeTextEntry3] : No mesh exposure

## 2022-01-19 ENCOUNTER — APPOINTMENT (OUTPATIENT)
Dept: UROGYNECOLOGY | Facility: CLINIC | Age: 67
End: 2022-01-19
Payer: COMMERCIAL

## 2022-01-19 ENCOUNTER — OUTPATIENT (OUTPATIENT)
Dept: OUTPATIENT SERVICES | Facility: HOSPITAL | Age: 67
LOS: 1 days | End: 2022-01-19
Payer: SELF-PAY

## 2022-01-19 VITALS
HEIGHT: 62 IN | TEMPERATURE: 97.1 F | WEIGHT: 192 LBS | SYSTOLIC BLOOD PRESSURE: 122 MMHG | DIASTOLIC BLOOD PRESSURE: 79 MMHG | HEART RATE: 97 BPM | BODY MASS INDEX: 35.33 KG/M2

## 2022-01-19 DIAGNOSIS — Z98.890 OTHER SPECIFIED POSTPROCEDURAL STATES: Chronic | ICD-10-CM

## 2022-01-19 DIAGNOSIS — Z98.89 OTHER SPECIFIED POSTPROCEDURAL STATES: Chronic | ICD-10-CM

## 2022-01-19 DIAGNOSIS — N36.8 OTHER SPECIFIED DISORDERS OF URETHRA: ICD-10-CM

## 2022-01-19 PROCEDURE — G0463: CPT

## 2022-01-19 PROCEDURE — 99024 POSTOP FOLLOW-UP VISIT: CPT

## 2022-01-19 NOTE — DISCUSSION/SUMMARY
[FreeTextEntry1] : \par Qiana is s/p robotic assisted supracervical hysterectomy, bilateral salpingo-oophorectomy, sacrocolpopexy, cystoscopy on 11/24/21. She is doing well overall.  \par \par Her pathology was not available at her 2 week post op and is now.  We reviewed normal uterus, tubes and ovaries.  Fibroids noted on path, all benign.  Discussed with patient, all questions answered.\par \par We discussed her rectocele and her constipation.  We discussed how apex is still well suspended.  Recommend change in bowel regimen to MiraLAX and fiber supplement with increasing PO fluids, written instructions provided.  Reviewed options for rectocele including pessary, expectant management, kegels and possible future surgical repair with posterior repair.  She is intersted in kegels at home, IUGA information on kegels provided.  To follow up in 3 months.\par

## 2022-01-19 NOTE — SUBJECTIVE
[FreeTextEntry1] : Feels well.  Has occasional vaginal pressure but denies feeling of prolapse/something coming out.   [FreeTextEntry8] : None [FreeTextEntry7] : None [FreeTextEntry6] : Normal [FreeTextEntry5] : Normal. Denies dysuria, incontinence, incomplete emptying.  No leakage. [FreeTextEntry4] : She is still using colace and senna for constipation, did not try miralax and fiber.  Has infrequent BMs and constipation and straining with BM, has BM 1-2x per week.  Passing flatus. [FreeTextEntry3] : Normal [FreeTextEntry2] : Normal

## 2022-01-19 NOTE — OBJECTIVE
[FreeTextEntry3] : No mesh exposure.  [place] very well supported.  Rectocele noted with descent to 0.  Otherwise no prolapse.

## 2022-01-31 DIAGNOSIS — Z98.890 OTHER SPECIFIED POSTPROCEDURAL STATES: ICD-10-CM

## 2022-04-12 ENCOUNTER — APPOINTMENT (OUTPATIENT)
Dept: UROGYNECOLOGY | Facility: CLINIC | Age: 67
End: 2022-04-12
Payer: COMMERCIAL

## 2022-04-12 ENCOUNTER — OUTPATIENT (OUTPATIENT)
Dept: OUTPATIENT SERVICES | Facility: HOSPITAL | Age: 67
LOS: 1 days | End: 2022-04-12
Payer: SELF-PAY

## 2022-04-12 DIAGNOSIS — Z98.89 OTHER SPECIFIED POSTPROCEDURAL STATES: Chronic | ICD-10-CM

## 2022-04-12 DIAGNOSIS — Z98.890 OTHER SPECIFIED POSTPROCEDURAL STATES: Chronic | ICD-10-CM

## 2022-04-12 DIAGNOSIS — N36.8 OTHER SPECIFIED DISORDERS OF URETHRA: ICD-10-CM

## 2022-04-12 PROCEDURE — 99214 OFFICE O/P EST MOD 30 MIN: CPT | Mod: GC

## 2022-04-12 PROCEDURE — G0463: CPT

## 2022-04-12 NOTE — DISCUSSION/SUMMARY
[FreeTextEntry1] : \par We reviewed her symptoms and the above findings. We discussed the importance of controlling her constipation and how it is likely contributing to her prolapse symptoms. In addition to the daily miralax, she will add fiber supplementation and increase her water intake. Her last colonoscopy was 10 years ago. Referral was given for screening colonoscopy. \par \par For her prolapse, we reviewed treatment options which included observation, Kegel exercises and behavioral modification, a pessary, or surgical correction. Surgically we discussed a posterior repair. We discussed the postoperative course and restrictions. In addition, we discussed how chronic constipation results in increased intraabdominal pressure on the pelvic floor, which could impair healing after surgical correction. She is interested in surgical correction and understands that she will work to get her constipation under control before and after the surgery. \par \par IUGA handout on constipation given to patient.

## 2022-04-12 NOTE — PHYSICAL EXAM
[Chaperone Present] : A chaperone was present in the examining room during all aspects of the physical examination [Labia Majora] : were normal [Labia Minora] : were normal [Normal Appearance] : general appearance was normal [Ap ____] : Ap [unfilled] [Bp ____] : Bp [unfilled] [Absent] : absent [Aa ____] : Aa [unfilled] [Ba ____] : Ba [unfilled] [C ____] : C [unfilled] [GH ____] : GH [unfilled] [PB ____] : PB [unfilled] [Normal rectal exam] : was normal [Normal] : was normal [TVL ____] : TVL  [unfilled] [FreeTextEntry1] : General: Well, appearing. Alert and orientated. No acute distress\par HEENT: Normocephalic, atraumatic and extraocular muscles appear to be intact \par Neck: Full range of motion, no obvious lymphadenopathy, deformities, or masses noted \par Respiratory: Speaking in full sentences comfortably, normal work of breathing and no cough during visit\par Musculoskeletal: active full range of motion in extremities \par Extremities: No upper extremity edema noted\par Skin: no obvious rash or skin lesions\par Neuro: Orientated X 3, speech is fluent, normal rate\par Psych: Normal mood and affect \par   [FreeTextEntry4] : No mesh exposure

## 2022-04-12 NOTE — HISTORY OF PRESENT ILLNESS
[Cystocele (Obstetric)] : moderate [Uterine Prolapse] : moderate [Vaginal Wall Prolapse] : no [Unable To Restrain Bowel Movement] : no [Feelings Of Urinary Urgency] : mild [Urinary Tract Infection] : no [] : no [de-identified] : Daily [FreeTextEntry3] : Every BM [de-identified] : 4-5x/day [FreeTextEntry1] : \par Qiana is s/p RASCH, BSO, SCP, cystoscopy with umbilical hernia repair on 11/24/21. She presents for follow up of stage 2 posterior wall prolapse and constipation. At her last visit on 1/19/22, she was placed on a bowel regimen. She is taking miralax and having daily bowel movements; however, her stool is still hard and comes out in pellets. She needs to splint every time in order to defecate because she feels the stool gets "stuck". In addition, she had been performing pelvic floor exercises at home inconsistently without improvement in her pressure symptoms. \par \par Daily fluid intake: 8oz coffee + 40-60oz plain water

## 2022-04-13 ENCOUNTER — APPOINTMENT (OUTPATIENT)
Dept: UROGYNECOLOGY | Facility: CLINIC | Age: 67
End: 2022-04-13
Payer: COMMERCIAL

## 2022-04-13 VITALS
BODY MASS INDEX: 32.61 KG/M2 | WEIGHT: 191 LBS | SYSTOLIC BLOOD PRESSURE: 146 MMHG | DIASTOLIC BLOOD PRESSURE: 82 MMHG | TEMPERATURE: 96.6 F | OXYGEN SATURATION: 99 % | HEIGHT: 64 IN | HEART RATE: 68 BPM

## 2022-04-13 DIAGNOSIS — N81.6 RECTOCELE: ICD-10-CM

## 2022-04-13 PROCEDURE — 99214 OFFICE O/P EST MOD 30 MIN: CPT | Mod: GC

## 2022-04-13 NOTE — PROCEDURE
[Straight Catheterization] : insertion of a straight catheter [Urinary Tract Infection] : a urinary tract infection [___ Fr Straight Tip] : a [unfilled] in [place] straight tip catheter [None] : there were no complications with the catheter insertion [Clear] : clear [Culture] : culture

## 2022-04-15 LAB — BACTERIA UR CULT: NORMAL

## 2022-04-18 ENCOUNTER — APPOINTMENT (OUTPATIENT)
Dept: INTERNAL MEDICINE | Facility: CLINIC | Age: 67
End: 2022-04-18
Payer: COMMERCIAL

## 2022-04-18 ENCOUNTER — OUTPATIENT (OUTPATIENT)
Dept: OUTPATIENT SERVICES | Facility: HOSPITAL | Age: 67
LOS: 1 days | End: 2022-04-18

## 2022-04-18 VITALS
DIASTOLIC BLOOD PRESSURE: 80 MMHG | HEIGHT: 64 IN | BODY MASS INDEX: 32.78 KG/M2 | SYSTOLIC BLOOD PRESSURE: 140 MMHG | HEART RATE: 72 BPM | OXYGEN SATURATION: 99 % | WEIGHT: 192 LBS | TEMPERATURE: 97.1 F

## 2022-04-18 DIAGNOSIS — Z98.89 OTHER SPECIFIED POSTPROCEDURAL STATES: Chronic | ICD-10-CM

## 2022-04-18 DIAGNOSIS — N81.6 RECTOCELE: ICD-10-CM

## 2022-04-18 DIAGNOSIS — Z98.890 OTHER SPECIFIED POSTPROCEDURAL STATES: Chronic | ICD-10-CM

## 2022-04-18 DIAGNOSIS — Z01.818 ENCOUNTER FOR OTHER PREPROCEDURAL EXAMINATION: ICD-10-CM

## 2022-04-18 PROCEDURE — ZZZZZ: CPT | Mod: GE

## 2022-04-18 RX ORDER — ZOSTER VACCINE RECOMBINANT, ADJUVANTED 50 MCG/0.5
50 KIT INTRAMUSCULAR
Qty: 1 | Refills: 0 | Status: ACTIVE | COMMUNITY
Start: 2022-04-18 | End: 1900-01-01

## 2022-04-18 RX ORDER — FAMOTIDINE 10 MG/1
10 TABLET, FILM COATED ORAL
Qty: 180 | Refills: 3 | Status: COMPLETED | COMMUNITY
Start: 2021-12-01 | End: 2022-04-18

## 2022-04-18 RX ORDER — ATORVASTATIN CALCIUM 20 MG/1
20 TABLET, FILM COATED ORAL
Qty: 90 | Refills: 3 | Status: ACTIVE | COMMUNITY
Start: 2020-10-06

## 2022-04-18 NOTE — END OF VISIT
[FreeTextEntry3] : I have fully participated in the care of this patient. I was physically present for the key portions of the visit. I have reviewed all pertinent clinical information, including history, physical exam, plan and the note and I agree.

## 2022-04-18 NOTE — PHYSICAL EXAM
[No Acute Distress] : no acute distress [Well Nourished] : well nourished [Well Developed] : well developed [Normal Sclera/Conjunctiva] : normal sclera/conjunctiva [Normal Outer Ear/Nose] : the outer ears and nose were normal in appearance [Supple] : supple [No Respiratory Distress] : no respiratory distress  [No Accessory Muscle Use] : no accessory muscle use [Clear to Auscultation] : lungs were clear to auscultation bilaterally [Normal Rate] : normal rate  [Soft] : abdomen soft [Non Tender] : non-tender [Non-distended] : non-distended [No Focal Deficits] : no focal deficits

## 2022-04-18 NOTE — HISTORY OF PRESENT ILLNESS
[Cystocele (Obstetric)] : moderate [Uterine Prolapse] : moderate [Vaginal Wall Prolapse] : no [Unable To Restrain Bowel Movement] : no [Feelings Of Urinary Urgency] : mild [Urinary Tract Infection] : no [] : no [de-identified] : Daily [FreeTextEntry3] : Every BM [de-identified] : 4-5x/day [FreeTextEntry1] : \par Qiana is s/p RASCH, BSO, SCP, cystoscopy with umbilical hernia repair on 11/24/21. She presents for follow up of stage 2 posterior wall prolapse. We reviewed management options, and she continues to desire surgical management. \par \par PMH: T2DM, HTN, HLD, venous stasis of L leg w/ associated edema\par PSH: knee surgery\par Meds: HCTZ, metformin, amlodipine\par  \par Daily fluid intake: 40-60oz plain water

## 2022-04-18 NOTE — PHYSICAL EXAM
[Chaperone Present] : A chaperone was present in the examining room during all aspects of the physical examination [Labia Majora] : were normal [Labia Minora] : were normal [Normal Appearance] : general appearance was normal [Aa ____] : Aa [unfilled] [Ba ____] : Ba [unfilled] [C ____] : C [unfilled] [GH ____] : GH [unfilled] [PB ____] : PB [unfilled] [TVL ____] : TVL  [unfilled] [Ap ____] : Ap [unfilled] [Bp ____] : Bp [unfilled] [Absent] : absent [Normal rectal exam] : was normal [Normal] : was normal [FreeTextEntry1] : General: Well, appearing. Alert and orientated. No acute distress\par HEENT: Normocephalic, atraumatic and extraocular muscles appear to be intact \par Neck: Full range of motion, no obvious lymphadenopathy, deformities, or masses noted \par Respiratory: Speaking in full sentences comfortably, normal work of breathing and no cough during visit\par Musculoskeletal: active full range of motion in extremities \par Extremities: No upper extremity edema noted\par Skin: no obvious rash or skin lesions\par Neuro: Orientated X 3, speech is fluent, normal rate\par Psych: Normal mood and affect \par   [FreeTextEntry4] : No mesh exposure

## 2022-04-18 NOTE — DISCUSSION/SUMMARY
[FreeTextEntry1] : \par I reviewed the findings with Qiana with her daughter, Samantha, on the phone. After extensive counseling regarding surgical and non surgical options, the patient has elected for posterior vaginal repair. We reviewed risks to the procedure including, but not limited to: bleeding, infection, pain, urinary retention requiring an indwelling catheter, development or worsening of stress incontinence, failure of procedure to alleviate symptoms, development or worsening of overactive bladder or urge incontinence, voiding dysfunction, fecal incontinence, defacatory dysfunction, dyspareunia. We also extensively reviewed the risk of  injury to the bladder, ureters, urethra, vagina, rectum, and bowel. We also discussed the risk of fistula formation, neuropathy, suture erosion, pain, and need for reoperation. Risks were explained in layman's terms. She expressed understanding of these risks and continues to desire the planned surgical procedure. We reviewed her hospital stay as well as preoperative and postoperative instructions. She is aware that she must be NPO after midnight prior to the surgery. \par \par In addition, we discussed extensively the importance of having regular bowel movements prior to surgery, as constipation and constant straining can increase the likelihood that the repair will fail. She will continue with colace, miralax, and increase her fluid intake. \par \par Consent was signed in the office for pelvic exam under anesthesia, posterior repair. Possible anterior repair, cystoscopy\par

## 2022-04-19 DIAGNOSIS — K59.09 OTHER CONSTIPATION: ICD-10-CM

## 2022-04-20 ENCOUNTER — APPOINTMENT (OUTPATIENT)
Dept: INTERNAL MEDICINE | Facility: CLINIC | Age: 67
End: 2022-04-20

## 2022-04-20 ENCOUNTER — RESULT REVIEW (OUTPATIENT)
Age: 67
End: 2022-04-20

## 2022-04-20 LAB
ANION GAP SERPL CALC-SCNC: 14 MMOL/L — SIGNIFICANT CHANGE UP (ref 7–14)
BASOPHILS # BLD AUTO: 0.04 K/UL — SIGNIFICANT CHANGE UP (ref 0–0.2)
BASOPHILS NFR BLD AUTO: 0.8 % — SIGNIFICANT CHANGE UP (ref 0–2)
BUN SERPL-MCNC: 15 MG/DL — SIGNIFICANT CHANGE UP (ref 7–23)
CALCIUM SERPL-MCNC: 10.1 MG/DL — SIGNIFICANT CHANGE UP (ref 8.4–10.5)
CHLORIDE SERPL-SCNC: 104 MMOL/L — SIGNIFICANT CHANGE UP (ref 98–107)
CO2 SERPL-SCNC: 26 MMOL/L — SIGNIFICANT CHANGE UP (ref 22–31)
CREAT SERPL-MCNC: 0.69 MG/DL — SIGNIFICANT CHANGE UP (ref 0.5–1.3)
EGFR: 96 ML/MIN/1.73M2 — SIGNIFICANT CHANGE UP
EOSINOPHIL # BLD AUTO: 0.09 K/UL — SIGNIFICANT CHANGE UP (ref 0–0.5)
EOSINOPHIL NFR BLD AUTO: 1.7 % — SIGNIFICANT CHANGE UP (ref 0–6)
GLUCOSE SERPL-MCNC: 104 MG/DL — HIGH (ref 70–99)
HCT VFR BLD CALC: 42 % — SIGNIFICANT CHANGE UP (ref 34.5–45)
HGB BLD-MCNC: 13.2 G/DL — SIGNIFICANT CHANGE UP (ref 11.5–15.5)
IANC: 2.41 K/UL — SIGNIFICANT CHANGE UP (ref 1.8–7.4)
IMM GRANULOCYTES NFR BLD AUTO: 0.6 % — SIGNIFICANT CHANGE UP (ref 0–1.5)
LYMPHOCYTES # BLD AUTO: 2.2 K/UL — SIGNIFICANT CHANGE UP (ref 1–3.3)
LYMPHOCYTES # BLD AUTO: 41.5 % — SIGNIFICANT CHANGE UP (ref 13–44)
MAGNESIUM SERPL-MCNC: 2.1 MG/DL — SIGNIFICANT CHANGE UP (ref 1.6–2.6)
MCHC RBC-ENTMCNC: 25.7 PG — LOW (ref 27–34)
MCHC RBC-ENTMCNC: 31.4 GM/DL — LOW (ref 32–36)
MCV RBC AUTO: 81.9 FL — SIGNIFICANT CHANGE UP (ref 80–100)
MONOCYTES # BLD AUTO: 0.53 K/UL — SIGNIFICANT CHANGE UP (ref 0–0.9)
MONOCYTES NFR BLD AUTO: 10 % — SIGNIFICANT CHANGE UP (ref 2–14)
NEUTROPHILS # BLD AUTO: 2.41 K/UL — SIGNIFICANT CHANGE UP (ref 1.8–7.4)
NEUTROPHILS NFR BLD AUTO: 45.4 % — SIGNIFICANT CHANGE UP (ref 43–77)
NRBC # BLD: 0 /100 WBCS — SIGNIFICANT CHANGE UP
NRBC # FLD: 0 K/UL — SIGNIFICANT CHANGE UP
PHOSPHATE SERPL-MCNC: 3.4 MG/DL — SIGNIFICANT CHANGE UP (ref 2.5–4.5)
PLATELET # BLD AUTO: 298 K/UL — SIGNIFICANT CHANGE UP (ref 150–400)
POTASSIUM SERPL-MCNC: 4.6 MMOL/L — SIGNIFICANT CHANGE UP (ref 3.5–5.3)
POTASSIUM SERPL-SCNC: 4.6 MMOL/L — SIGNIFICANT CHANGE UP (ref 3.5–5.3)
RBC # BLD: 5.13 M/UL — SIGNIFICANT CHANGE UP (ref 3.8–5.2)
RBC # FLD: 15.1 % — HIGH (ref 10.3–14.5)
SODIUM SERPL-SCNC: 144 MMOL/L — SIGNIFICANT CHANGE UP (ref 135–145)
WBC # BLD: 5.3 K/UL — SIGNIFICANT CHANGE UP (ref 3.8–10.5)
WBC # FLD AUTO: 5.3 K/UL — SIGNIFICANT CHANGE UP (ref 3.8–10.5)

## 2022-04-22 DIAGNOSIS — Z01.818 ENCOUNTER FOR OTHER PREPROCEDURAL EXAMINATION: ICD-10-CM

## 2022-04-26 ENCOUNTER — OUTPATIENT (OUTPATIENT)
Dept: OUTPATIENT SERVICES | Facility: HOSPITAL | Age: 67
LOS: 1 days | End: 2022-04-26
Payer: SELF-PAY

## 2022-04-26 VITALS
TEMPERATURE: 98 F | HEIGHT: 63 IN | RESPIRATION RATE: 16 BRPM | OXYGEN SATURATION: 99 % | DIASTOLIC BLOOD PRESSURE: 82 MMHG | HEART RATE: 76 BPM | SYSTOLIC BLOOD PRESSURE: 138 MMHG

## 2022-04-26 DIAGNOSIS — E11.9 TYPE 2 DIABETES MELLITUS WITHOUT COMPLICATIONS: ICD-10-CM

## 2022-04-26 DIAGNOSIS — N81.6 RECTOCELE: ICD-10-CM

## 2022-04-26 DIAGNOSIS — N81.9 FEMALE GENITAL PROLAPSE, UNSPECIFIED: ICD-10-CM

## 2022-04-26 DIAGNOSIS — Z01.818 ENCOUNTER FOR OTHER PREPROCEDURAL EXAMINATION: ICD-10-CM

## 2022-04-26 DIAGNOSIS — Z98.890 OTHER SPECIFIED POSTPROCEDURAL STATES: Chronic | ICD-10-CM

## 2022-04-26 DIAGNOSIS — Z98.89 OTHER SPECIFIED POSTPROCEDURAL STATES: Chronic | ICD-10-CM

## 2022-04-26 DIAGNOSIS — Z90.711 ACQUIRED ABSENCE OF UTERUS WITH REMAINING CERVICAL STUMP: Chronic | ICD-10-CM

## 2022-04-26 DIAGNOSIS — I10 ESSENTIAL (PRIMARY) HYPERTENSION: ICD-10-CM

## 2022-04-26 LAB
BLD GP AB SCN SERPL QL: NEGATIVE — SIGNIFICANT CHANGE UP
RH IG SCN BLD-IMP: POSITIVE — SIGNIFICANT CHANGE UP

## 2022-04-26 PROCEDURE — 86900 BLOOD TYPING SEROLOGIC ABO: CPT

## 2022-04-26 PROCEDURE — 86901 BLOOD TYPING SEROLOGIC RH(D): CPT

## 2022-04-26 PROCEDURE — G0463: CPT

## 2022-04-26 PROCEDURE — 86850 RBC ANTIBODY SCREEN: CPT

## 2022-04-26 RX ORDER — SODIUM CHLORIDE 9 MG/ML
3 INJECTION INTRAMUSCULAR; INTRAVENOUS; SUBCUTANEOUS EVERY 8 HOURS
Refills: 0 | Status: DISCONTINUED | OUTPATIENT
Start: 2022-05-05 | End: 2022-05-19

## 2022-04-26 RX ORDER — SODIUM CHLORIDE 9 MG/ML
1000 INJECTION, SOLUTION INTRAVENOUS
Refills: 0 | Status: DISCONTINUED | OUTPATIENT
Start: 2022-05-05 | End: 2022-05-19

## 2022-04-26 NOTE — H&P PST ADULT - PROBLEM SELECTOR PLAN 1
Posterior Repair Cystoscopy on 5/5/22  Medical eval on file  CBC/BMP ([facility])   Pre-op education provided - all questions answered. Pt verbalized understanding

## 2022-04-26 NOTE — H&P PST ADULT - HISTORY OF PRESENT ILLNESS
67 yo female, PMH HTN, HLD, DM2,  - all vaginal deliveries, menopause in her 50's, reports uterus prolapse for about 2 years, which is becoming worse, unable to empty bladder completely - has to use Depends, not candidate for pessary s/p laparoscopic Robotic Supracervical Hysterectomy, Laparoscopic Robotic Sacral Colpopexy, Cystoscopy, on 21. Pt presents to PST for stage 2 posterior wall prolapse Posterior Repair Cystoscopy on 22. Denies any palpitations, SOB, N/V, fever or chills.   ***COVID swab scheduled for 22***

## 2022-04-26 NOTE — H&P PST ADULT - NSICDXPASTMEDICALHX_GEN_ALL_CORE_FT
PAST MEDICAL HISTORY:  Bacterial meningitis had spinal tap 1996    Carpal tunnel syndrome on right     DM2 (diabetes mellitus, type 2)     HLD (hyperlipidemia)     HTN (hypertension)     Obesity     Prolapse of female pelvic organs     Tear of meniscus of knee left knee

## 2022-04-26 NOTE — H&P PST ADULT - NSICDXPASTSURGICALHX_GEN_ALL_CORE_FT
PAST SURGICAL HISTORY:  S/P carpal tunnel release right wrist    S/P knee surgery left arthoscopic surgery    S/P laparoscopic supracervical hysterectomy Laparoscopic Robotic Supracervical Hysterectomy, Laparoscopic Robotic Sacral Colpopexy, Cystoscopy 11/24/21

## 2022-05-02 ENCOUNTER — OUTPATIENT (OUTPATIENT)
Dept: OUTPATIENT SERVICES | Facility: HOSPITAL | Age: 67
LOS: 1 days | End: 2022-05-02
Payer: SELF-PAY

## 2022-05-02 DIAGNOSIS — Z98.890 OTHER SPECIFIED POSTPROCEDURAL STATES: Chronic | ICD-10-CM

## 2022-05-02 DIAGNOSIS — Z90.711 ACQUIRED ABSENCE OF UTERUS WITH REMAINING CERVICAL STUMP: Chronic | ICD-10-CM

## 2022-05-02 DIAGNOSIS — Z98.89 OTHER SPECIFIED POSTPROCEDURAL STATES: Chronic | ICD-10-CM

## 2022-05-02 DIAGNOSIS — Z11.52 ENCOUNTER FOR SCREENING FOR COVID-19: ICD-10-CM

## 2022-05-02 LAB — SARS-COV-2 RNA SPEC QL NAA+PROBE: SIGNIFICANT CHANGE UP

## 2022-05-02 PROCEDURE — U0003: CPT

## 2022-05-02 PROCEDURE — U0005: CPT

## 2022-05-02 PROCEDURE — C9803: CPT

## 2022-05-04 ENCOUNTER — TRANSCRIPTION ENCOUNTER (OUTPATIENT)
Age: 67
End: 2022-05-04

## 2022-05-04 ENCOUNTER — NON-APPOINTMENT (OUTPATIENT)
Age: 67
End: 2022-05-04

## 2022-05-04 NOTE — ASSESSMENT
[Ischemic Heart Disease] : Ischemic Heart Disease - No (0) [Congestive Heart Failure] : Congestive Heart Failure - No (0) [Prior Cerebrovascular Accident or TIA] : Prior Cerebrovascular Accident or TIA - No (0) [Creatinine >= 2mg/dL (1 Point)] : Creatinine >= 2mg/dL - No (0) [Insulin-dependent Diabetic (1 Point)] : Insulin-dependent Diabetic - No (0) [Patient Optimized for Surgery] : Patient optimized for surgery [Modify medications prior to procedure] : Modify medications prior to procedure [No Further Testing Recommended] : no further testing recommended [High Risk Surgery - Intraperitoneal, Intrathoracic or Supringuinal Vascular Procedures] : High Risk Surgery - Intraperitoneal, Intrathoracic or Supringuinal Vascular Procedures - No (0) [0] : 0 , RCRI Class: I, Risk of Post-Op Cardiac Complications: 3.9%, 95% CI for Risk Estimate: 2.8% - 5.4% [As per surgery] : as per surgery [FreeTextEntry4] : Pt is low risk for low risk procedure.  [FreeTextEntry7] : Hold HCTZ day before procedure

## 2022-05-04 NOTE — HISTORY OF PRESENT ILLNESS
[No Pertinent Cardiac History] : no history of aortic stenosis, atrial fibrillation, coronary artery disease, recent myocardial infarction, or implantable device/pacemaker [No Adverse Anesthesia Reaction] : no adverse anesthesia reaction in self or family member [No Pertinent Pulmonary History] : no history of asthma, COPD, sleep apnea, or smoking [Diabetes] : diabetes [Moderate (4-6 METs)] : Moderate (4-6 METs) [FreeTextEntry4] : 66F with PMH of HTN, HLD, prediabetes, uterine prolapse presenting for pre op clearance for posterior vaginal repair. Patient is Class II Risk\par 6.0 % 30-day risk of death, MI, or cardiac arrest. Patient currently has no complaints.

## 2022-05-04 NOTE — PLAN
[FreeTextEntry1] : 66F with PMH of HTN, HLD, prediabetes, uterine prolapse presenting for pre op clearance for posterior vaginal repair. \par \par #Preop Clearance\par Patient is Class II Risk 6.0 % 30-day risk of death, MI, or cardiac arrest\par - BMP,CBC, Mg, phos at this visit \par - Patient instructed to hold HCTZ day before surg\par - Patient has pre op appt scheduled \par \par #HTN\par -c/w amlodipine and HCTZ\par -Hold HCTZ day before surgery\par \par #DM\par -c/w metformin \par \par #HLD\par -c/w atorvastatin\par \par #HCM\par -Last colonoscopy 2016 wnl\par -Due for shingrex; sent to pharmacy\par -Due for pneumovax; Prevnar 20 given at this visit \par \par Case discussed w/ Dr. Elliott \par RTC to f/u posterior vaginal repair\par \par Tima Vickers\par EMIM PGY3

## 2022-05-05 ENCOUNTER — TRANSCRIPTION ENCOUNTER (OUTPATIENT)
Age: 67
End: 2022-05-05

## 2022-05-05 ENCOUNTER — OUTPATIENT (OUTPATIENT)
Dept: OUTPATIENT SERVICES | Facility: HOSPITAL | Age: 67
LOS: 1 days | Discharge: HOME | End: 2022-05-05
Payer: SELF-PAY

## 2022-05-05 ENCOUNTER — APPOINTMENT (OUTPATIENT)
Dept: UROGYNECOLOGY | Facility: HOSPITAL | Age: 67
End: 2022-05-05
Payer: COMMERCIAL

## 2022-05-05 VITALS
DIASTOLIC BLOOD PRESSURE: 75 MMHG | HEIGHT: 63 IN | TEMPERATURE: 97 F | SYSTOLIC BLOOD PRESSURE: 129 MMHG | HEART RATE: 71 BPM | OXYGEN SATURATION: 96 % | RESPIRATION RATE: 16 BRPM | WEIGHT: 190.04 LBS

## 2022-05-05 VITALS
SYSTOLIC BLOOD PRESSURE: 124 MMHG | OXYGEN SATURATION: 96 % | DIASTOLIC BLOOD PRESSURE: 70 MMHG | RESPIRATION RATE: 14 BRPM | HEART RATE: 87 BPM

## 2022-05-05 DIAGNOSIS — Z01.818 ENCOUNTER FOR OTHER PREPROCEDURAL EXAMINATION: ICD-10-CM

## 2022-05-05 DIAGNOSIS — Z98.89 OTHER SPECIFIED POSTPROCEDURAL STATES: Chronic | ICD-10-CM

## 2022-05-05 DIAGNOSIS — N81.6 RECTOCELE: ICD-10-CM

## 2022-05-05 DIAGNOSIS — Z90.711 ACQUIRED ABSENCE OF UTERUS WITH REMAINING CERVICAL STUMP: Chronic | ICD-10-CM

## 2022-05-05 DIAGNOSIS — Z98.890 OTHER SPECIFIED POSTPROCEDURAL STATES: Chronic | ICD-10-CM

## 2022-05-05 LAB — GLUCOSE BLDC GLUCOMTR-MCNC: 86 MG/DL — SIGNIFICANT CHANGE UP (ref 70–99)

## 2022-05-05 PROCEDURE — 57250 REPAIR RECTUM & VAGINA: CPT

## 2022-05-05 PROCEDURE — 82962 GLUCOSE BLOOD TEST: CPT

## 2022-05-05 RX ORDER — LIDOCAINE HCL 20 MG/ML
0.2 VIAL (ML) INJECTION ONCE
Refills: 0 | Status: COMPLETED | OUTPATIENT
Start: 2022-05-05 | End: 2022-05-05

## 2022-05-05 RX ORDER — CEFAZOLIN SODIUM 1 G
2000 VIAL (EA) INJECTION ONCE
Refills: 0 | Status: COMPLETED | OUTPATIENT
Start: 2022-05-05 | End: 2022-05-05

## 2022-05-05 RX ORDER — DIAZEPAM 5 MG
1 TABLET ORAL
Qty: 20 | Refills: 0
Start: 2022-05-05

## 2022-05-05 RX ORDER — METFORMIN HYDROCHLORIDE 850 MG/1
1 TABLET ORAL
Qty: 0 | Refills: 0 | DISCHARGE

## 2022-05-05 RX ORDER — IBUPROFEN 200 MG
1 TABLET ORAL
Qty: 20 | Refills: 0
Start: 2022-05-05

## 2022-05-05 RX ORDER — AMLODIPINE BESYLATE 2.5 MG/1
1 TABLET ORAL
Qty: 0 | Refills: 0 | DISCHARGE

## 2022-05-05 RX ORDER — ATORVASTATIN CALCIUM 80 MG/1
1 TABLET, FILM COATED ORAL
Qty: 0 | Refills: 0 | DISCHARGE

## 2022-05-05 RX ADMIN — SODIUM CHLORIDE 100 MILLILITER(S): 9 INJECTION, SOLUTION INTRAVENOUS at 08:51

## 2022-05-05 NOTE — ASU DISCHARGE PLAN (ADULT/PEDIATRIC) - ACTIVITY LEVEL
No heavy lifting/No weight bearing/Nothing per rectum/Nothing per vagina/No tub baths/No douching/No tampons/No intercourse

## 2022-05-05 NOTE — ASU DISCHARGE PLAN (ADULT/PEDIATRIC) - CARE PROVIDER_API CALL
Candy Christopher (MD)  Female Pelvic MedReconst Surg; Obstetrics and Gynecology  [address], [address]  [place] [address]  Phone: [phone]  Fax: [phone]  Scheduled Appointment: 05/18/2022 12:00 PM

## 2022-05-05 NOTE — ASU DISCHARGE PLAN (ADULT/PEDIATRIC) - NS MD DC FALL RISK RISK
For information on Fall & Injury Prevention, visit: [email or web address] OR  [email or web address] OR  https://www.cdc.gov/steadi/patient.html

## 2022-05-05 NOTE — ASU PATIENT PROFILE, ADULT - TEACHING/LEARNING LEARNING PREFERENCES
Hyperlipidemia Hyperlipidemia Hyperlipidemia Hyperlipidemia Hyperlipidemia Hypertension Hyperlipidemia Hyperlipidemia Hyperlipidemia verbal instruction/written material

## 2022-05-05 NOTE — CHART NOTE - NSCHARTNOTEFT_GEN_A_CORE
Pt seen at bedside for TOV. Vaginal packing x1 removed. Bladder backfilled with 300cc NS, pt urinated 200cc. Passed TOV.     Alyson Sharma, PGY-2  D/w Dr Christopher

## 2022-05-05 NOTE — ASU DISCHARGE PLAN (ADULT/PEDIATRIC) - ASU DC SPECIAL INSTRUCTIONSFT
DISCHARGE INSTRUCTIONS     Bowel regimen    To avoid constipation and straining, we suggest the following (simultaneously):    1. Colace (stool softener) 100mg, one pill three times a day (breakfast, lunch, dinner). If stool is too soft, decrease to twice a day (breakfast, dinner)    If still constipated, you can add: Miralax once at bedtime  All of these agents can be obtained over the counter at the pharmacy.    Pain control.    For pain control, take the followin.  Motrin 800mg three times a day, take with food    2.  Add Tylenol as needed if still have pain despite Motrin  Motrin and Tylenol can be obtained over the counter.    Postoperative urinary catheter    If you failed your voiding trial in the hospital and are sent home with a catheter, please call the office ([phone]) so you can come in to have a repeat voiding trial/catheter removal in a few days.    Postoperative restrictions    Nothing in the vagina (tampons, sexual intercourse), No tub baths, pools or hot tubs for 6 weeks (showers are ok!)    No lifting anything heavier than 10 lbs, no strenuous exercise for 2 weeks after surgery. Do not pull or cut any stitches that you see around your incision.    Vaginal bleeding    Spotting per vagina is normal in first few weeks after surgery. If you are soaking 1 pad per hour, that is not normal and you should notify my office and seek medical attention right away.    Vaginal discharge    Vaginal discharge (all colors) is normal after vaginal surgery. If you’ve had vaginal surgery, you have sutures in your vagina which take 3 months to fully absorb. You may have vaginal discharge during this time. This is normal.

## 2022-05-05 NOTE — ASU DISCHARGE PLAN (ADULT/PEDIATRIC) - NURSING INSTRUCTIONS
Next dose of Tylenol will be on or after 4:30pm  ,today/tonight, If needed for pain/cramps. Your first dose of Tylenol was given at 10:30 am_. Do not exceed more than 4000mg of Tylenol in one 24 hour setting.

## 2022-05-05 NOTE — BRIEF OPERATIVE NOTE - OPERATION/FINDINGS
Stage II pelvic organ prolapse noted, posterior vaginal wall predominant. No apical or anterior wall descent.

## 2022-05-18 ENCOUNTER — APPOINTMENT (OUTPATIENT)
Dept: UROGYNECOLOGY | Facility: CLINIC | Age: 67
End: 2022-05-18
Payer: COMMERCIAL

## 2022-05-18 ENCOUNTER — OUTPATIENT (OUTPATIENT)
Dept: OUTPATIENT SERVICES | Facility: HOSPITAL | Age: 67
LOS: 1 days | End: 2022-05-18
Payer: SELF-PAY

## 2022-05-18 VITALS — DIASTOLIC BLOOD PRESSURE: 70 MMHG | TEMPERATURE: 95.9 F | SYSTOLIC BLOOD PRESSURE: 140 MMHG

## 2022-05-18 DIAGNOSIS — Z98.89 OTHER SPECIFIED POSTPROCEDURAL STATES: Chronic | ICD-10-CM

## 2022-05-18 DIAGNOSIS — Z90.711 ACQUIRED ABSENCE OF UTERUS WITH REMAINING CERVICAL STUMP: Chronic | ICD-10-CM

## 2022-05-18 DIAGNOSIS — Z98.890 OTHER SPECIFIED POSTPROCEDURAL STATES: Chronic | ICD-10-CM

## 2022-05-18 PROCEDURE — G0463: CPT

## 2022-05-18 PROCEDURE — 99024 POSTOP FOLLOW-UP VISIT: CPT

## 2022-05-18 NOTE — DISCUSSION/SUMMARY
[Post-Op instructions given. Pt/family verbalizes understanding] : post-operative instructions were provided to the patient/family who verbalize understanding [Risks/Benefits discussed. Pt/family verbalizes understanding] : risks and benefits of the procedure were discussed with the patient/family who verbalize understanding [FreeTextEntry1] : Discussed post op precautions and restrictions.  Discussed sensation of vaginal fullness, no prolapse on exam , well supported, discussed patient will have increased sensitivity while healing however healing well.  Discussed bowel regimen, to continue.  To follow up in 4 weeks.

## 2022-05-18 NOTE — OBJECTIVE
[Voiding Trial] : No voiding trial was performed [Post Void Residual ____ ml] : Post Void Residual was [unfilled] ml [Soft and Nontender] : soft and nontender [Clean, Dry, Intact] : Clean, Dry, Intact [Good Support] : Good support [Healing well] : healing well [No Masses or Tenderness] : no masses or tenderness [FreeTextEntry3] : Excellent support, sutures in place, healing well

## 2022-05-18 NOTE — SUBJECTIVE
[FreeTextEntry1] : Recovering well.  Feels a bit of vaginal fullness but no bulge asks if this is normal after a posterior repair [FreeTextEntry8] : Miralax and other stool softener (cannot remember type) [FreeTextEntry7] : Controlled, no meds [FreeTextEntry6] : None [FreeTextEntry5] : Normal [FreeTextEntry4] : Normal, regular, soft, and passing flatus [FreeTextEntry3] : Normal [FreeTextEntry2] : Normal [FreeTextEntry9] : No vaginal spotting no abnormal vaginal discharge

## 2022-05-25 DIAGNOSIS — N76.0 ACUTE VAGINITIS: ICD-10-CM

## 2022-05-26 ENCOUNTER — NON-APPOINTMENT (OUTPATIENT)
Age: 67
End: 2022-05-26

## 2022-05-26 DIAGNOSIS — Z98.890 OTHER SPECIFIED POSTPROCEDURAL STATES: ICD-10-CM

## 2022-05-26 RX ORDER — METFORMIN ER 500 MG 500 MG/1
500 TABLET ORAL DAILY
Qty: 180 | Refills: 3 | Status: ACTIVE | COMMUNITY
Start: 2019-05-21 | End: 1900-01-01

## 2022-06-21 ENCOUNTER — OUTPATIENT (OUTPATIENT)
Dept: OUTPATIENT SERVICES | Facility: HOSPITAL | Age: 67
LOS: 1 days | End: 2022-06-21
Payer: SELF-PAY

## 2022-06-21 ENCOUNTER — APPOINTMENT (OUTPATIENT)
Dept: UROGYNECOLOGY | Facility: CLINIC | Age: 67
End: 2022-06-21
Payer: COMMERCIAL

## 2022-06-21 DIAGNOSIS — Z90.711 ACQUIRED ABSENCE OF UTERUS WITH REMAINING CERVICAL STUMP: Chronic | ICD-10-CM

## 2022-06-21 DIAGNOSIS — Z98.890 OTHER SPECIFIED POSTPROCEDURAL STATES: Chronic | ICD-10-CM

## 2022-06-21 DIAGNOSIS — Z98.890 OTHER SPECIFIED POSTPROCEDURAL STATES: ICD-10-CM

## 2022-06-21 DIAGNOSIS — Z98.89 OTHER SPECIFIED POSTPROCEDURAL STATES: Chronic | ICD-10-CM

## 2022-06-21 DIAGNOSIS — N76.0 ACUTE VAGINITIS: ICD-10-CM

## 2022-06-21 PROCEDURE — 99213 OFFICE O/P EST LOW 20 MIN: CPT | Mod: 24

## 2022-06-21 PROCEDURE — G0463: CPT

## 2022-06-21 NOTE — SUBJECTIVE
[FreeTextEntry8] : D [FreeTextEntry7] : Denies pain.  [FreeTextEntry6] : Good appetite.  [FreeTextEntry5] : Denies dysuria, urinary frequency, urgency or urinary incontinence.  [FreeTextEntry4] : BM regular without issue.  [FreeTextEntry3] : Ambulating without difficulty.  [FreeTextEntry2] : Sleeping without difficulty.

## 2022-06-21 NOTE — OBJECTIVE
[Clean, Dry, Intact] : Clean, Dry, Intact [Good Support] : Good support [Healing well] : healing well [No Masses or Tenderness] : no masses or tenderness [FreeTextEntry3] : No prolapse noted

## 2022-06-21 NOTE — DISCUSSION/SUMMARY
[FreeTextEntry1] : Qiana is a 65 yo s/p posterior repair on 5/5/2022. She is doing well since surgery. She does report occasional vaginal bulge as she had reported last visit. No prolapse noted on examination today, similar to last visit Discussed use of pelvic floor exercises if continues. Discussed continuing bowel regimen. Pt instructed to follow up as needed or if any issues arise. All questions answered.

## 2022-06-28 ENCOUNTER — APPOINTMENT (OUTPATIENT)
Dept: UROGYNECOLOGY | Facility: CLINIC | Age: 67
End: 2022-06-28

## 2022-06-28 DIAGNOSIS — Z98.890 OTHER SPECIFIED POSTPROCEDURAL STATES: ICD-10-CM

## 2022-06-30 ENCOUNTER — APPOINTMENT (OUTPATIENT)
Dept: GASTROENTEROLOGY | Facility: CLINIC | Age: 67
End: 2022-06-30

## 2022-06-30 ENCOUNTER — OUTPATIENT (OUTPATIENT)
Dept: OUTPATIENT SERVICES | Facility: HOSPITAL | Age: 67
LOS: 1 days | End: 2022-06-30

## 2022-06-30 VITALS
BODY MASS INDEX: 32.27 KG/M2 | HEART RATE: 64 BPM | TEMPERATURE: 97.9 F | SYSTOLIC BLOOD PRESSURE: 108 MMHG | HEIGHT: 64 IN | WEIGHT: 189 LBS | DIASTOLIC BLOOD PRESSURE: 60 MMHG | OXYGEN SATURATION: 99 % | RESPIRATION RATE: 16 BRPM

## 2022-06-30 DIAGNOSIS — N81.4 UTEROVAGINAL PROLAPSE, UNSPECIFIED: ICD-10-CM

## 2022-06-30 DIAGNOSIS — K59.09 OTHER CONSTIPATION: ICD-10-CM

## 2022-06-30 DIAGNOSIS — Z90.711 ACQUIRED ABSENCE OF UTERUS WITH REMAINING CERVICAL STUMP: Chronic | ICD-10-CM

## 2022-06-30 DIAGNOSIS — K21.9 GASTRO-ESOPHAGEAL REFLUX DISEASE W/OUT ESOPHAGITIS: ICD-10-CM

## 2022-06-30 DIAGNOSIS — K21.9 GASTRO-ESOPHAGEAL REFLUX DISEASE WITHOUT ESOPHAGITIS: ICD-10-CM

## 2022-06-30 DIAGNOSIS — Z98.89 OTHER SPECIFIED POSTPROCEDURAL STATES: Chronic | ICD-10-CM

## 2022-06-30 DIAGNOSIS — Z98.890 OTHER SPECIFIED POSTPROCEDURAL STATES: Chronic | ICD-10-CM

## 2022-06-30 PROCEDURE — ZZZZZ: CPT | Mod: GC

## 2022-06-30 RX ORDER — PSYLLIUM HUSK 0.4 G
0.52 CAPSULE ORAL
Qty: 30 | Refills: 2 | Status: ACTIVE | COMMUNITY
Start: 2022-06-30 | End: 1900-01-01

## 2022-06-30 RX ORDER — LORATADINE 5 MG
17 TABLET,CHEWABLE ORAL
Qty: 1 | Refills: 2 | Status: ACTIVE | COMMUNITY
Start: 2022-06-30 | End: 1900-01-01

## 2022-06-30 NOTE — PHYSICAL EXAM
[General Appearance - Alert] : alert [General Appearance - In No Acute Distress] : in no acute distress [Sclera] : the sclera and conjunctiva were normal [Neck Appearance] : the appearance of the neck was normal [] : no respiratory distress [Heart Rate And Rhythm] : heart rate was normal and rhythm regular [Bowel Sounds] : normal bowel sounds [Abdomen Soft] : soft [Abdomen Tenderness] : non-tender [No Focal Deficits] : no focal deficits [Oriented To Time, Place, And Person] : oriented to person, place, and time

## 2022-06-30 NOTE — HISTORY OF PRESENT ILLNESS
[de-identified] : Ms. Joseph is a 66 year-old female with PMH of HTN, HLD, prediabetes, uterine and rectal prolapse after repair who presents for evaluation for "colonoscopy".\par \par Patient was interested in seeing if she needs a repeat colonoscopy. Last colonoscopy 2016 with diverticulosis, otherwise normal and was recommended a repeat in 10 years. Denies hematochezia, melena, abdominal pain, significant weight loss. She endorses chronic constipation - having a hard, pellet shaped bowel movement every other day. Taking MiraLAX intermittently. Endorses low fiber diet. \par \par Patient endorses longstanding epigastric pain described as heartburn with reflux, that happens daily towards the end of the day. Taking Zantac intermittently with relief. Reports significant relief with diet modifications. Never had an endoscopy.

## 2022-08-05 ENCOUNTER — OUTPATIENT (OUTPATIENT)
Dept: OUTPATIENT SERVICES | Facility: HOSPITAL | Age: 67
LOS: 1 days | Discharge: HOME | End: 2022-08-05

## 2022-08-05 VITALS
RESPIRATION RATE: 16 BRPM | WEIGHT: 197.09 LBS | HEIGHT: 63 IN | TEMPERATURE: 97 F | OXYGEN SATURATION: 99 % | SYSTOLIC BLOOD PRESSURE: 140 MMHG | DIASTOLIC BLOOD PRESSURE: 68 MMHG | HEART RATE: 67 BPM

## 2022-08-05 VITALS
DIASTOLIC BLOOD PRESSURE: 73 MMHG | OXYGEN SATURATION: 98 % | HEART RATE: 66 BPM | RESPIRATION RATE: 18 BRPM | SYSTOLIC BLOOD PRESSURE: 120 MMHG

## 2022-08-05 DIAGNOSIS — K22.70 BARRETT'S ESOPHAGUS W/OUT DYSPLASIA: ICD-10-CM

## 2022-08-05 DIAGNOSIS — Z90.711 ACQUIRED ABSENCE OF UTERUS WITH REMAINING CERVICAL STUMP: Chronic | ICD-10-CM

## 2022-08-05 DIAGNOSIS — Z98.89 OTHER SPECIFIED POSTPROCEDURAL STATES: Chronic | ICD-10-CM

## 2022-08-05 DIAGNOSIS — K21.9 GASTRO-ESOPHAGEAL REFLUX DISEASE WITHOUT ESOPHAGITIS: ICD-10-CM

## 2022-08-05 DIAGNOSIS — Z98.890 OTHER SPECIFIED POSTPROCEDURAL STATES: Chronic | ICD-10-CM

## 2022-08-05 LAB
GLUCOSE BLDC GLUCOMTR-MCNC: 89 MG/DL — SIGNIFICANT CHANGE UP (ref 70–99)
GLUCOSE BLDC GLUCOMTR-MCNC: 95 MG/DL — SIGNIFICANT CHANGE UP (ref 70–99)

## 2022-08-05 PROCEDURE — 43239 EGD BIOPSY SINGLE/MULTIPLE: CPT | Mod: GC

## 2022-08-05 RX ORDER — PANTOPRAZOLE 40 MG/1
40 TABLET, DELAYED RELEASE ORAL TWICE DAILY
Qty: 180 | Refills: 2 | Status: ACTIVE | COMMUNITY
Start: 2022-08-05 | End: 1900-01-01

## 2022-08-05 NOTE — ASU PREOP CHECKLIST - HEIGHT IN INCHES
Impression: Other chronic allergic conjunctivitis: H10.45. OU. Symptoms: will treat with meds. Plan: Patient educated that symptoms are caused by ocular allergies and that treatment will help alleviate symptoms but that it will not prevent allergies. Patient educated that allergy testing with an allergy specialist may be necessary to identify allergens affecting patient and treat condition. Prescribed Sidney-Poly-Dex celia BID OU x3-4 wks and Prednisolone acetate 1% QID OU x 7-10 days, then start azelastine or OTC [place] BID OU for ocular itch.
3

## 2022-08-05 NOTE — ASU PATIENT PROFILE, ADULT - FALL HARM RISK - UNIVERSAL INTERVENTIONS
Bed in lowest position, wheels locked, appropriate side rails in place/Call bell, personal items and telephone in reach/Instruct patient to call for assistance before getting out of bed or chair/Non-slip footwear when patient is out of bed/[place] to call system/Physically safe environment - no spills, clutter or unnecessary equipment/Purposeful Proactive Rounding/Room/bathroom lighting operational, light cord in reach

## 2022-12-13 ENCOUNTER — RX RENEWAL (OUTPATIENT)
Age: 67
End: 2022-12-13

## 2022-12-13 RX ORDER — AMLODIPINE BESYLATE 10 MG/1
10 TABLET ORAL
Qty: 90 | Refills: 2 | Status: ACTIVE | COMMUNITY
Start: 2019-01-14 | End: 1900-01-01

## 2023-02-28 NOTE — H&P PST ADULT - PROBLEM SELECTOR PLAN 1
3/1/2023      Alyssia Hackett  [facility] [address]  [address]  [place] [address]    Dear Ms. Hackett,    Your procedure is scheduled with Dr. Scooter Royal on May 9, 2023 at 12:30 pm at:    [facility]   [address] [place] [address]  [phone]    Please register at [facility] on May  9, 2023  at 10:30 am.  These times may change due to various OR schedule needs. You will receive a call prior to your surgery to confirm arrival and surgery times.      The following appointment(s) have been scheduled for you:    · Pre-op Appt with Dr. Royal on May 1, 2023 at 9:50 am at [facility], Orthopaedics, [address].    · Post-op Appt with Alfredo Dunn, PAC for Dr. Royal on May 25, 2023 at 1:00 pm  at [facility], 1st Floor Clinic.          To better prepare for your surgery, please follow these instructions:    • Please schedule an appointment with your Primary Care Physician for a PreOperative History and Physical for within 30 days before your surgery date. This is a pre-operative requirement for medical clearance for surgery/anesthesia. Your primary care physician will perform a history and physical and order lab work to review, ensuring there is not a medical concern that would prevent you from safely proceeding with surgery.  We will send them the information about your planned procedure and what testing we are looking for (and where to send it to).  If you haven't already done so, please call them ASAP to schedule an appointment.     • Starting 10 days prior to your surgery, please do not take any Phentermine or other diet/weight loss products.  Continuing these medications in the 10 days before surgery will likely result in postponement due to anesthesia requirements.  Please consult with your prescribing physicians if you have any questions.     • Please do not take any aspirin products, anti-inflammatory  medication or blood thinners for 7 days before your surgery.  This includes products such as [place], Pepto Bismol, Motrin, Ibuprofen and Advil should also be avoided.  (Tylenol products are aspirin free, so Tylenol products are OK to take for pain.).      · If you take prescription medication, including blood thinners (such as coumadin or Plavix), please contact your ordering or primary care physician ASAP for possible changes to your medication schedule.     • Do not have anything to eat or drink starting at midnight the night before your surgery.   You will receive a carbohydrate drink and instructions at your PreOp Follow-up with Dr. Royal.      • For your safety, must have a ride home after surgery, due to both anesthesia and post-op pain medication.  This must be with someone who can take responsibility for you and assist with your discharge from the surgery center, not a cab, bus, etc.  You will need someone available to remain with you up to 24 hours after you have been discharged.      • Please remember that all times are subject to change as the hospital coordinates the schedule to meet the needs of your surgery and the overall flow of the OR that day.    • Pre-Procedural COVID Testing is NOT required as long as you remain symptom-free from now through your surgery date.    • At any time, if you experience COVID-like symptoms, please contact your primary care physician for evaluation.    • If you test positive for COVID between now and your surgery date, please call our office as soon as possible.  We might need to postpone your procedure until it is safe for you to proceed.    • Masking and Social Distancing continue to be crucial.    You will receive an invitation via email from [employer] to view BLOVES.    This informative web-based education program will give you helpful information pertaining to your upcoming surgery. If you do not have an active email, you may contact BLOVES at [phone] to  obtain an access code.     You will receive an invitation via email from [place] to complete [facility] Questionnaires.    You may receive an invitation from us to your mobile number, email address, or a phone call from our office to complete a short 5 min questionnaire regarding the impact your diagnosis has on your current abilities and activities. We partner with a company called [email or web address] to gather this information.  Your surgeon is hoping to use this information to emperatriz and monitor your progress starting before your surgery and in the weeks/months during your recovery after your procedure.    In addition, your Insurance Company might also require this information in order to complete the PreAuthorization process for your upcoming surgery.    Thank you for your participation!      You can expect to receive a call from [employer]. They will provide you with information designed to help you prepare for your surgery both before and after.    Dr. Royal would like you to attend a Physical Therapy appointment before surgery... called PREHAB. You will work one on one with a therapist on exercises to increase your strength and receive practice tips to keep you independent postoperatively. The [facility] department will call you to schedule an appointment. Order was faxed to [place] [facility] - fax# [phone].      You will receive a call a week before surgery from [employer] and/or [employer] regarding setting up a time and date to deliver equipment Dr. Royla would like you to use after surgery.    If you have any work related and/or disability forms that need to be completed, please contact the Forms Completion Department at [phone]. Forms can be dropped off at any of our [facility] locations. Please be advised that it can take 7 to 14 business days to complete these requests.    If you have questions regarding the procedure, medications, rehab, etc., please contact the nursing staff at  [phone].    If you have any scheduling questions or need to reschedule, please contact me at the telephone number and extension listed below.       Thank you,    Aaron at [phone]  Surgery Scheduler for Dr. Scooter Royal  [facility]                                                                 Insurance Authorization Need to Know's    Prior to your surgical procedure, our team will contact your Insurance Company to initiate a PreAuthorization request.      This is not a guarantee of payment from your insurance company, but rather a step taken to ensure that we have all of the information and documentation for them to confirm the procedure is one that is eligible for coverage under your plan.    We will contact you if we either need more information from you to fulfill the requirements of your insurance company, or if we need to discuss any concerns that may lead to postponement or cancellation of your procedure. If you have any questions regarding your surgery authorization, please check with your insurance company or call our office for an update.    If you need information regarding your level of benefits or out-of-pocket expenses, please contact your insurance company directly.  They can also confirm for you whether or not we (the surgeon and the hospital/surgery center) are in your plan's preferred network (aka 'in-network').    What to do if… My Insurance Changes:  If, at any time, your insurance company, plan or even card changes… Please call our office so that our team can be sure to update your records.  We will need to make sure to submit any PreAuth or irma to the correct, up-to-date insurance plan.      What to do if… My Insurance Requires A Referral:  If your insurance company requires a Referral for Specialty Care or to see a Specialist, you will need to confirm with them if you have one on file.    - If your insurance carrier does not have a referral, then you  will need to contact your Primary Care Physician to have one directly submitted to your insurance company ASAP.    - Without a referral on file, your insurance company will not Pre-Authorize your surgery and may not cover any of your care with our specialty.    What to do if… I have a Workers Compensation (W/C) Claim:  If you have a W/C claim, please be sure to provide our reception team with the information you have regarding your claim ASAP.  We will contact your W/C carrier/adjustor to inform them of your upcoming surgery and check the status of your claim (open vs closed).  We will let you know if they advise of any concerns or issues with your claim.  - Even if you have an open W/C claim, please also provide us with your personal/family insurance.  We will want to be sure this plan is loaded into your account.  We always PreAuthorize with personal insurance as a back-up to W/C.  Otherwise, if W/C doesn't cover something along the way, you will receive a bill for the services.    What to do if… I have Month-to-Month Coverage/Premiums:  If you have an insurance plan that is paid for month to month, or is subject to plan change on a monthly basis, please be aware we cannot initiate PreAuthorization until just before the month of your surgery, as your insurance company will need to verify your premium payments/eligibility first.    What to do if… I Do Not Have Insurance Coverage or Have other Insurance/Billing Questions:  Please call our Patient Contact Center:  [phone] to speak with a team member about your billing needs, including possible coverage options, setting up payment plans, our fee schedule, etc.                 Laparoscopic Robotic Supracervical Hysterectomy, Laparoscopic Robotic Sacral colpopexy, cystoscopy, ERP on 11/24/21  Pre-op instructions, including Chlorhexidine soap, provided - all questions answered  Labs done at [facility]  COVID-19 screening on 11/22 at [facility]

## 2024-02-28 NOTE — H&P PST ADULT - EYES
Physical Therapy Treatment Note     Name: Geetha Meyers  Clinic Number: [id number]    Therapy Diagnosis:   No diagnosis found.    Physician: Mane Hernandez MD    Visit Date: 2/28/2024       Physician Orders: PT Eval and Treat   Medical Diagnosis from Referral: Fibromyalgia   Evaluation Date: 2/8/2024  Authorization Period Expiration: 12/31/2024  Plan of Care Expiration: 4/8/2024  Progress Note Due: 3/8/2024  Date of Surgery: NA  Visit # / Visits authorized: 3/20   FOTO: 0/ 3 NEED FOTO     Precautions: Standard      Time In: 4:37  Time Out: 5:13  Total Billable Time: 36 minutes    Subjective     Pt reports: Pt reports that she is feeling better.  She was compliant with home exercise program.  Response to previous treatment: pt was a little sore      Pain: 6/10  Location: left shoulder      Objective     Geetha received therapeutic exercises to develop strength, endurance, ROM, flexibility, and posture for 05 minutes including:  Open book x 10 with L UE    Geetha received the following manual therapy techniques: Joint mobilizations, Manual traction, and Soft tissue Mobilization were applied to the: Cx and thoracic spine and rib cage for 15 minutes, including:  Passive mobilization to the thoracic spine and rib cage  Soft tissue mobilization to the thoracic paraspinal muscles    Geetha participated in neuromuscular re-education activities to improve: Balance and Coordination for 20 minutes. The following activities were included:  Supine B shoulder horizontal abduction 10 x 3 with yellow TB  Supine scapular retractions 10 x 2  Supine No Money 10 x 2 with yellow TB with loops  Standing rows 10 x 3 with yellow TB    Geetha participated in dynamic functional therapeutic activities to improve functional performance for 00  minutes, including:      Geetha participated in gait training to improve functional mobility and safety for 00  minutes, including:      Home Exercises Provided and Patient Education Provided     Education  provided:   - added No Money to HEP Pt given yellow TB with loops to keep stress off her hands    Written Home Exercises Provided: Patient instructed to cont prior HEP.  Exercises were reviewed and Geetha was able to demonstrate them prior to the end of the session.  Geetha demonstrated good  understanding of the education provided.     See EMR under for exercises provided  pt was seen in September - November of last year for the same Dx.  She is to continue with her previous HEP  .    Assessment     Pt reports decreased pain in her L upper quarter today  She was able to tolerate exercises well and reported discomfort with L side rib cage mobilization. Geetha Is progressing well towards her goals.   Pt prognosis is Good.     Pt will continue to benefit from skilled outpatient physical therapy to address the deficits listed in the problem list box on initial evaluation, provide pt/family education and to maximize pt's level of independence in the home and community environment.     Pt's spiritual, cultural and educational needs considered and pt agreeable to plan of care and goals.     Anticipated barriers to physical therapy: compliance    Goals:   Short Term Goals: 4 weeks   Pt will be instructed in an exercise program to address functional deficits related to her Sx.  Decrease Pt's pain to </= 7/10     Long Term Goals: 8 weeks   Pt will be independent in an exercise program to assist in managing her Sx.  Decrease Pt'a pain to </= 5/10  Plan     Progress Physical Therapy program to assist Pt with managing her Sx.    Toney Brown, PT      PERRL/conjunctiva clear detailed exam

## 2024-12-13 NOTE — ASSESSMENT
[FreeTextEntry1] : 66 year-old female with PMH of HTN, HLD, prediabetes, uterine and rectal prolapse after repair who presents for evaluation for "colonoscopy".\par \par # Colorectal screening - up to date, due for repeat colonoscopy 2026\par # GERD - longstanding symptoms with no alarm signs. Would plan for endoscopy to evaluate for Milan's given longstanding disease and to evaluate for evidence of GERD off treatment.\par # Constipation - chronic, in the setting of low fiber diet. Possible underlying pelvic flood dysfunction.\par # Rectal prolapse - repaired\par \par Plan:\par - EGD\par - Will hold off from starting PPIs today until after the EGD\par - Discussed weight loss\par - Start fiber supplements\par - MiraLAX, recommended to be taken at night time\par \par Discussed with Dr. Montez
Plan: Pt is currently finishing up her 4th bag of Xtresse gummies \\nContinue Xtresse gummies daily
Detail Level: Zone

## (undated) DEVICE — PREP BETADINE SPONGE STICKS

## (undated) DEVICE — TUBING IV SET GRAVITY 3Y 100" MACRO

## (undated) DEVICE — LUBRICATING JELLY HR ONE SHOT 3G

## (undated) DEVICE — BIOPSY FORCEP RADIAL JAW 4 STANDARD WITH NEEDLE

## (undated) DEVICE — NDL HYPO SAFE 18G X 1.5" (PINK)

## (undated) DEVICE — FACESHIELD FULL VISOR

## (undated) DEVICE — DRSG DERMABOND 0.7ML

## (undated) DEVICE — UNDERPAD LINEN SAVER 17 X 24"

## (undated) DEVICE — SOL IRR BAG H2O 3000ML

## (undated) DEVICE — GLV 6.5 PROTEXIS (WHITE)

## (undated) DEVICE — VENODYNE/SCD SLEEVE CALF LARGE

## (undated) DEVICE — DRAPE TOWEL BLUE 17" X 24"

## (undated) DEVICE — POSITIONER FOAM EGG CRATE ULNAR 2PCS (PINK)

## (undated) DEVICE — ELCTR ECG CONDUCTIVE ADHESIVE

## (undated) DEVICE — DENTURE CUP PINK

## (undated) DEVICE — SALIVA EJECTOR (BLUE)

## (undated) DEVICE — DRSG BANDAID 0.75X3"

## (undated) DEVICE — TUBING SUCTION 20FT

## (undated) DEVICE — MEDICATION LABELS W MARKER

## (undated) DEVICE — WARMING BLANKET UPPER ADULT

## (undated) DEVICE — GOWN LG

## (undated) DEVICE — LONE STAR RETRACTOR RING 32.5CM X 18.3CM DISP

## (undated) DEVICE — DRAPE LAVH 124" X 30" X125"

## (undated) DEVICE — PACK IV START WITH CHG

## (undated) DEVICE — LINE BREATHE SAMPLNG

## (undated) DEVICE — DRSG CURITY GAUZE SPONGE 4 X 4" 12-PLY NON-STERILE

## (undated) DEVICE — SYR LUER LOK 10CC

## (undated) DEVICE — PACK MINOR

## (undated) DEVICE — CLAMP BX HOT RAD JAW 3

## (undated) DEVICE — SOL IRR POUR NS 0.9% 500ML

## (undated) DEVICE — TUBING TUR 2 PRONG

## (undated) DEVICE — FOLEY CATH 2-WAY 16FR 5CC LATEX

## (undated) DEVICE — CONTAINER FORMALIN 80ML YELLOW

## (undated) DEVICE — TUBING MEDI-VAC W MAXIGRIP CONNECTORS 1/4"X6'

## (undated) DEVICE — FOLEY TRAY 16FR 5CC LTX UMETER CLOSED

## (undated) DEVICE — BITE BLOCK ADULT 20 X 27MM (GREEN)

## (undated) DEVICE — DRAPE INSTRUMENT POUCH 6.75" X 11"

## (undated) DEVICE — BASIN EMESIS 10IN GRADUATED MAUVE

## (undated) DEVICE — SUT POLYSORB 2-0 18" V-20

## (undated) DEVICE — DRSG 2X2

## (undated) DEVICE — BIOPSY FORCEP COLD DISP

## (undated) DEVICE — LAP PAD 18 X 18"

## (undated) DEVICE — DRAPE MAYO STAND 30"

## (undated) DEVICE — FOLEY CATH 2-WAY 18FR 5CC SILICONE

## (undated) DEVICE — SOL IRR POUR H2O 250ML

## (undated) DEVICE — LONE STAR ELASTIC STAY HOOK 5MM SHARP

## (undated) DEVICE — CATH IV SAFE BC 22G X 1" (BLUE)

## (undated) DEVICE — SPECIMEN CONTAINER 100ML